# Patient Record
Sex: FEMALE | Race: WHITE | Employment: OTHER | URBAN - METROPOLITAN AREA
[De-identification: names, ages, dates, MRNs, and addresses within clinical notes are randomized per-mention and may not be internally consistent; named-entity substitution may affect disease eponyms.]

---

## 2016-09-13 LAB
ALBUMIN SERPL-MCNC: 4.4 G/DL
ALP BLD-CCNC: 84 U/L
ALT SERPL-CCNC: 11 U/L
ANION GAP SERPL CALCULATED.3IONS-SCNC: 2.2 MMOL/L
AST SERPL-CCNC: 20 U/L
BILIRUB SERPL-MCNC: 0.5 MG/DL (ref 0.1–1.4)
BUN BLDV-MCNC: 28 MG/DL
CALCIUM SERPL-MCNC: 9.6 MG/DL
CHLORIDE BLD-SCNC: 102 MMOL/L
CHOLESTEROL, TOTAL: 193 MG/DL
CHOLESTEROL/HDL RATIO: NORMAL
CO2: 26 MMOL/L
CREAT SERPL-MCNC: 1.4 MG/DL
GFR CALCULATED: 37
GLUCOSE BLD-MCNC: 120 MG/DL
HDLC SERPL-MCNC: 39 MG/DL (ref 35–70)
LDL CHOLESTEROL CALCULATED: 95 MG/DL (ref 0–160)
POTASSIUM SERPL-SCNC: 4.4 MMOL/L
SODIUM BLD-SCNC: 141 MMOL/L
TOTAL PROTEIN: 6.4
TRIGL SERPL-MCNC: 297 MG/DL
VLDLC SERPL CALC-MCNC: NORMAL MG/DL

## 2017-01-16 ENCOUNTER — TELEPHONE (OUTPATIENT)
Dept: CARDIOLOGY CLINIC | Age: 77
End: 2017-01-16

## 2017-01-26 ENCOUNTER — OFFICE VISIT (OUTPATIENT)
Dept: CARDIOLOGY CLINIC | Age: 77
End: 2017-01-26

## 2017-01-26 VITALS
HEIGHT: 60 IN | BODY MASS INDEX: 26.7 KG/M2 | DIASTOLIC BLOOD PRESSURE: 80 MMHG | HEART RATE: 56 BPM | WEIGHT: 136 LBS | SYSTOLIC BLOOD PRESSURE: 150 MMHG

## 2017-01-26 DIAGNOSIS — K21.9 GASTROESOPHAGEAL REFLUX DISEASE WITHOUT ESOPHAGITIS: ICD-10-CM

## 2017-01-26 DIAGNOSIS — N28.9 RENAL INSUFFICIENCY: ICD-10-CM

## 2017-01-26 DIAGNOSIS — I25.119 CORONARY ARTERY DISEASE INVOLVING NATIVE CORONARY ARTERY OF NATIVE HEART WITH ANGINA PECTORIS (HCC): ICD-10-CM

## 2017-01-26 DIAGNOSIS — I10 ESSENTIAL HYPERTENSION: ICD-10-CM

## 2017-01-26 DIAGNOSIS — E78.00 HYPERCHOLESTEROLEMIA: ICD-10-CM

## 2017-01-26 DIAGNOSIS — F41.9 ANXIETY: ICD-10-CM

## 2017-01-26 DIAGNOSIS — R07.2 PRECORDIAL PAIN: Primary | ICD-10-CM

## 2017-01-26 PROCEDURE — 99214 OFFICE O/P EST MOD 30 MIN: CPT | Performed by: INTERNAL MEDICINE

## 2017-01-26 RX ORDER — NITROGLYCERIN 0.4 MG/1
0.4 TABLET SUBLINGUAL EVERY 5 MIN PRN
Qty: 100 TABLET | Refills: 1 | Status: SHIPPED | OUTPATIENT
Start: 2017-01-26

## 2017-02-08 ENCOUNTER — PROCEDURE VISIT (OUTPATIENT)
Dept: CARDIOLOGY CLINIC | Age: 77
End: 2017-02-08

## 2017-02-08 DIAGNOSIS — N28.9 RENAL INSUFFICIENCY: ICD-10-CM

## 2017-02-08 DIAGNOSIS — E78.00 HYPERCHOLESTEROLEMIA: ICD-10-CM

## 2017-02-08 DIAGNOSIS — I25.119 CORONARY ARTERY DISEASE INVOLVING NATIVE CORONARY ARTERY OF NATIVE HEART WITH ANGINA PECTORIS (HCC): ICD-10-CM

## 2017-02-08 DIAGNOSIS — R07.2 PRECORDIAL PAIN: ICD-10-CM

## 2017-02-08 DIAGNOSIS — K21.9 GASTROESOPHAGEAL REFLUX DISEASE WITHOUT ESOPHAGITIS: ICD-10-CM

## 2017-02-08 DIAGNOSIS — F41.9 ANXIETY: ICD-10-CM

## 2017-02-08 DIAGNOSIS — I10 ESSENTIAL HYPERTENSION: ICD-10-CM

## 2017-02-08 PROCEDURE — 93017 CV STRESS TEST TRACING ONLY: CPT | Performed by: INTERNAL MEDICINE

## 2017-02-08 PROCEDURE — 93016 CV STRESS TEST SUPVJ ONLY: CPT | Performed by: INTERNAL MEDICINE

## 2017-02-08 PROCEDURE — 93018 CV STRESS TEST I&R ONLY: CPT | Performed by: INTERNAL MEDICINE

## 2017-02-08 PROCEDURE — 78452 HT MUSCLE IMAGE SPECT MULT: CPT | Performed by: INTERNAL MEDICINE

## 2017-02-08 PROCEDURE — A9500 TC99M SESTAMIBI: HCPCS | Performed by: INTERNAL MEDICINE

## 2017-02-10 ENCOUNTER — TELEPHONE (OUTPATIENT)
Dept: CARDIOLOGY CLINIC | Age: 77
End: 2017-02-10

## 2017-08-28 ENCOUNTER — OFFICE VISIT (OUTPATIENT)
Dept: CARDIOLOGY CLINIC | Age: 77
End: 2017-08-28

## 2017-08-28 VITALS
BODY MASS INDEX: 26.5 KG/M2 | HEART RATE: 60 BPM | DIASTOLIC BLOOD PRESSURE: 70 MMHG | SYSTOLIC BLOOD PRESSURE: 138 MMHG | WEIGHT: 135 LBS | HEIGHT: 60 IN

## 2017-08-28 DIAGNOSIS — E78.00 HYPERCHOLESTEROLEMIA: ICD-10-CM

## 2017-08-28 DIAGNOSIS — I10 ESSENTIAL HYPERTENSION: ICD-10-CM

## 2017-08-28 DIAGNOSIS — I25.119 CORONARY ARTERY DISEASE INVOLVING NATIVE CORONARY ARTERY OF NATIVE HEART WITH ANGINA PECTORIS (HCC): Primary | ICD-10-CM

## 2017-08-28 DIAGNOSIS — N28.9 RENAL INSUFFICIENCY: ICD-10-CM

## 2017-08-28 DIAGNOSIS — K21.9 GASTROESOPHAGEAL REFLUX DISEASE WITHOUT ESOPHAGITIS: ICD-10-CM

## 2017-08-28 PROCEDURE — 99213 OFFICE O/P EST LOW 20 MIN: CPT | Performed by: INTERNAL MEDICINE

## 2018-04-23 ENCOUNTER — HOSPITAL ENCOUNTER (OUTPATIENT)
Dept: SPEECH THERAPY | Age: 78
Discharge: HOME | End: 2018-04-30
Attending: FAMILY MEDICINE | Admitting: FAMILY MEDICINE

## 2018-04-23 DIAGNOSIS — G30.9 ALZHEIMER'S DEMENTIA WITHOUT BEHAVIORAL DISTURBANCE, UNSPECIFIED TIMING OF DEMENTIA ONSET: Primary | ICD-10-CM

## 2018-04-23 DIAGNOSIS — F02.80 ALZHEIMER'S DEMENTIA WITHOUT BEHAVIORAL DISTURBANCE, UNSPECIFIED TIMING OF DEMENTIA ONSET: Primary | ICD-10-CM

## 2018-04-26 ENCOUNTER — HOSPITAL ENCOUNTER (OUTPATIENT)
Dept: SPEECH THERAPY | Age: 78
Discharge: HOME | End: 2018-04-26
Admitting: FAMILY MEDICINE

## 2018-05-01 ENCOUNTER — HOSPITAL ENCOUNTER (OUTPATIENT)
Dept: OTHER | Age: 78
Discharge: HOME | End: 2018-05-22
Attending: FAMILY MEDICINE | Admitting: FAMILY MEDICINE

## 2018-05-03 ENCOUNTER — HOSPITAL ENCOUNTER (OUTPATIENT)
Dept: SPEECH THERAPY | Age: 78
Discharge: HOME | End: 2018-05-03
Admitting: FAMILY MEDICINE

## 2018-05-07 ENCOUNTER — HOSPITAL ENCOUNTER (OUTPATIENT)
Dept: SPEECH THERAPY | Age: 78
Discharge: HOME | End: 2018-05-07
Admitting: FAMILY MEDICINE

## 2018-05-10 ENCOUNTER — HOSPITAL ENCOUNTER (OUTPATIENT)
Dept: SPEECH THERAPY | Age: 78
Discharge: HOME | End: 2018-05-10
Admitting: FAMILY MEDICINE

## 2018-05-14 ENCOUNTER — HOSPITAL ENCOUNTER (OUTPATIENT)
Dept: SPEECH THERAPY | Age: 78
Discharge: HOME | End: 2018-05-14
Admitting: FAMILY MEDICINE

## 2018-07-18 ENCOUNTER — HOSPITAL ENCOUNTER (OUTPATIENT)
Dept: PHYSICAL THERAPY | Age: 78
Discharge: HOME | End: 2018-07-31
Attending: FAMILY MEDICINE | Admitting: FAMILY MEDICINE

## 2018-07-18 NOTE — PROGRESS NOTES
ext only 3-3+/5 juan; hip ext, abd, add 4/5 both legs  Tone RLE  RLE Tone: Normotonic  Tone LLE  LLE Tone: Normotonic  Motor Control  Gross Motor?: WFL     Sensation  Overall Sensation Status: WFL  Bed mobility  Supine to Sit: Independent  Sit to Supine: Independent  Transfers  Sit to Stand: Modified independent  Stand to sit: Modified independent  Ambulation  Ambulation?: Yes  Ambulation 1  Surface: carpet  Device: Rolling Walker     Assessment  Patient presents with chronic LB pain and weakness in back and knee extensors, STM deficits, decreased transfers and decreased gait endurane which impacts on all ADLs standing and walking;patient's goal is to have less back pain ;patient reports that pain, weakness and decreased stand balance  limits activities including those noted; PT to address patient's goals, impairments and activity limitations with skilled interventions checked in plan of care;patient's level of function prior limited due to dementia and LB pain;  barriers to learning during PT eval include cognitive deficts; learning preferences include demonstration, practice, and handouts; patient expressed understanding of HEP; patient appears to be motivated to participate in an active PT program and to be compliant with HEP expectations with assist of spouse;patient assisted in developing treatment plan and goals; 4WW is currently being used;      Current functional level (based on )   : TUG  Conditions Requiring Skilled Therapeutic Intervention  Body structures, Functions, Activity limitations: Decreased functional mobility ; Decreased strength;Decreased endurance  Treatment Diagnosis: Chronic pain in LB; Memory Loss; decreased transfers; decreased gait endurance; decreased dynamic stand balance; decreased functional use of LB; weakness in juan knee extensors.   Prognosis: Fair;Good  Decision Making: Medium Complexity  Patient Education: HEP with daughter and spouse in attendance  Barriers to Learning: Memory

## 2018-08-01 ENCOUNTER — HOSPITAL ENCOUNTER (OUTPATIENT)
Dept: OTHER | Age: 78
Discharge: HOME | End: 2018-08-31
Attending: FAMILY MEDICINE | Admitting: FAMILY MEDICINE

## 2019-06-05 ENCOUNTER — HOSPITAL ENCOUNTER (OUTPATIENT)
Age: 79
Setting detail: SPECIMEN
Discharge: HOME | End: 2019-06-05
Payer: MEDICARE

## 2019-06-05 LAB
BACTERIA: ABNORMAL /HPF
BILIRUBIN URINE: NEGATIVE MG/DL
BLOOD, URINE: ABNORMAL
CLARITY: ABNORMAL
COLOR: YELLOW
GLUCOSE, URINE: NEGATIVE MG/DL
KETONES, URINE: NEGATIVE MG/DL
LEUKOCYTE ESTERASE, URINE: ABNORMAL
MUCUS: ABNORMAL HPF
NITRITE URINE, QUANTITATIVE: NEGATIVE
PH, URINE: 5 (ref 5–8)
PROTEIN UA: 100 MG/DL
RBC URINE: 17 /HPF (ref 0–6)
SPECIFIC GRAVITY UA: 1.02 (ref 1–1.03)
TRICHOMONAS: ABNORMAL /HPF
UROBILINOGEN, URINE: NORMAL MG/DL (ref 0.2–1)
WBC CLUMP: ABNORMAL /HPF
WBC UA: 2696 /HPF (ref 0–5)

## 2019-06-05 PROCEDURE — 87186 SC STD MICRODIL/AGAR DIL: CPT

## 2019-06-05 PROCEDURE — 87077 CULTURE AEROBIC IDENTIFY: CPT

## 2019-06-05 PROCEDURE — 81001 URINALYSIS AUTO W/SCOPE: CPT

## 2019-06-05 PROCEDURE — 87086 URINE CULTURE/COLONY COUNT: CPT

## 2019-06-08 LAB
CULTURE: ABNORMAL
Lab: ABNORMAL
SPECIMEN: ABNORMAL
TOTAL COLONY COUNT: ABNORMAL

## 2020-07-25 ENCOUNTER — APPOINTMENT (OUTPATIENT)
Dept: CT IMAGING | Age: 80
End: 2020-07-25
Payer: MEDICARE

## 2020-07-25 ENCOUNTER — APPOINTMENT (OUTPATIENT)
Dept: GENERAL RADIOLOGY | Age: 80
End: 2020-07-25
Payer: MEDICARE

## 2020-07-25 ENCOUNTER — HOSPITAL ENCOUNTER (EMERGENCY)
Age: 80
Discharge: HOME | End: 2020-07-25
Attending: EMERGENCY MEDICINE
Payer: MEDICARE

## 2020-07-25 VITALS
DIASTOLIC BLOOD PRESSURE: 92 MMHG | RESPIRATION RATE: 12 BRPM | TEMPERATURE: 97.5 F | HEART RATE: 60 BPM | OXYGEN SATURATION: 100 % | SYSTOLIC BLOOD PRESSURE: 155 MMHG

## 2020-07-25 LAB
ALBUMIN SERPL-MCNC: 4.1 GM/DL (ref 3.4–5)
ALP BLD-CCNC: 78 IU/L (ref 40–129)
ALT SERPL-CCNC: 13 U/L (ref 10–40)
AMMONIA: 31 UMOL/L (ref 11–51)
ANION GAP SERPL CALCULATED.3IONS-SCNC: 10 MMOL/L (ref 4–16)
APTT: 27.9 SECONDS (ref 25.1–37.1)
AST SERPL-CCNC: 16 IU/L (ref 15–37)
BACTERIA: ABNORMAL /HPF
BASOPHILS ABSOLUTE: 0.1 K/CU MM
BASOPHILS RELATIVE PERCENT: 0.8 % (ref 0–1)
BILIRUB SERPL-MCNC: 0.5 MG/DL (ref 0–1)
BILIRUBIN URINE: NEGATIVE MG/DL
BLOOD, URINE: NEGATIVE
BUN BLDV-MCNC: 17 MG/DL (ref 6–23)
CALCIUM SERPL-MCNC: 9.3 MG/DL (ref 8.3–10.6)
CHLORIDE BLD-SCNC: 104 MMOL/L (ref 99–110)
CLARITY: ABNORMAL
CO2: 25 MMOL/L (ref 21–32)
COLOR: ABNORMAL
CREAT SERPL-MCNC: 1.1 MG/DL (ref 0.6–1.1)
DIFFERENTIAL TYPE: ABNORMAL
EOSINOPHILS ABSOLUTE: 0.2 K/CU MM
EOSINOPHILS RELATIVE PERCENT: 2.8 % (ref 0–3)
GFR AFRICAN AMERICAN: 58 ML/MIN/1.73M2
GFR NON-AFRICAN AMERICAN: 48 ML/MIN/1.73M2
GLUCOSE BLD-MCNC: 136 MG/DL (ref 70–99)
GLUCOSE, URINE: NEGATIVE MG/DL
HCT VFR BLD CALC: 34.2 % (ref 37–47)
HEMOGLOBIN: 12.2 GM/DL (ref 12.5–16)
IMMATURE NEUTROPHIL %: 0.2 % (ref 0–0.43)
INR BLD: 1.04 INDEX
KETONES, URINE: NEGATIVE MG/DL
LEUKOCYTE ESTERASE, URINE: ABNORMAL
LYMPHOCYTES ABSOLUTE: 1.3 K/CU MM
LYMPHOCYTES RELATIVE PERCENT: 20.6 % (ref 24–44)
MCH RBC QN AUTO: 32.8 PG (ref 27–31)
MCHC RBC AUTO-ENTMCNC: 35.7 % (ref 32–36)
MCV RBC AUTO: 91.9 FL (ref 78–100)
MONOCYTES ABSOLUTE: 0.5 K/CU MM
MONOCYTES RELATIVE PERCENT: 8.2 % (ref 0–4)
MUCUS: ABNORMAL HPF
NITRITE URINE, QUANTITATIVE: POSITIVE
NUCLEATED RBC %: 0 %
PDW BLD-RTO: 12.4 % (ref 11.7–14.9)
PH, URINE: 5 (ref 5–8)
PLATELET # BLD: 159 K/CU MM (ref 140–440)
PMV BLD AUTO: 10.6 FL (ref 7.5–11.1)
POTASSIUM SERPL-SCNC: 3.4 MMOL/L (ref 3.5–5.1)
PRO-BNP: 56.66 PG/ML
PROTEIN UA: NEGATIVE MG/DL
PROTHROMBIN TIME: 12.6 SECONDS (ref 11.7–14.5)
RBC # BLD: 3.72 M/CU MM (ref 4.2–5.4)
RBC URINE: 5 /HPF (ref 0–6)
SEGMENTED NEUTROPHILS ABSOLUTE COUNT: 4.4 K/CU MM
SEGMENTED NEUTROPHILS RELATIVE PERCENT: 67.4 % (ref 36–66)
SODIUM BLD-SCNC: 139 MMOL/L (ref 135–145)
SPECIFIC GRAVITY UA: 1.02 (ref 1–1.03)
SQUAMOUS EPITHELIAL: 3 /HPF
TOTAL IMMATURE NEUTOROPHIL: 0.01 K/CU MM
TOTAL NUCLEATED RBC: 0 K/CU MM
TOTAL PROTEIN: 6.4 GM/DL (ref 6.4–8.2)
TRICHOMONAS: ABNORMAL /HPF
TROPONIN T: <0.01 NG/ML
UROBILINOGEN, URINE: 1 MG/DL (ref 0.2–1)
WBC # BLD: 6.5 K/CU MM (ref 4–10.5)
WBC CLUMP: ABNORMAL /HPF
WBC UA: 116 /HPF (ref 0–5)

## 2020-07-25 PROCEDURE — 72125 CT NECK SPINE W/O DYE: CPT

## 2020-07-25 PROCEDURE — 93005 ELECTROCARDIOGRAM TRACING: CPT | Performed by: EMERGENCY MEDICINE

## 2020-07-25 PROCEDURE — 90471 IMMUNIZATION ADMIN: CPT | Performed by: EMERGENCY MEDICINE

## 2020-07-25 PROCEDURE — 96365 THER/PROPH/DIAG IV INF INIT: CPT

## 2020-07-25 PROCEDURE — 71045 X-RAY EXAM CHEST 1 VIEW: CPT

## 2020-07-25 PROCEDURE — 85730 THROMBOPLASTIN TIME PARTIAL: CPT

## 2020-07-25 PROCEDURE — 83880 ASSAY OF NATRIURETIC PEPTIDE: CPT

## 2020-07-25 PROCEDURE — 85610 PROTHROMBIN TIME: CPT

## 2020-07-25 PROCEDURE — 85025 COMPLETE CBC W/AUTO DIFF WBC: CPT

## 2020-07-25 PROCEDURE — 74176 CT ABD & PELVIS W/O CONTRAST: CPT

## 2020-07-25 PROCEDURE — 99284 EMERGENCY DEPT VISIT MOD MDM: CPT

## 2020-07-25 PROCEDURE — 72170 X-RAY EXAM OF PELVIS: CPT

## 2020-07-25 PROCEDURE — 76376 3D RENDER W/INTRP POSTPROCES: CPT

## 2020-07-25 PROCEDURE — 87186 SC STD MICRODIL/AGAR DIL: CPT

## 2020-07-25 PROCEDURE — 82140 ASSAY OF AMMONIA: CPT

## 2020-07-25 PROCEDURE — 2580000003 HC RX 258: Performed by: EMERGENCY MEDICINE

## 2020-07-25 PROCEDURE — 87077 CULTURE AEROBIC IDENTIFY: CPT

## 2020-07-25 PROCEDURE — 80053 COMPREHEN METABOLIC PANEL: CPT

## 2020-07-25 PROCEDURE — 70450 CT HEAD/BRAIN W/O DYE: CPT

## 2020-07-25 PROCEDURE — 6360000002 HC RX W HCPCS: Performed by: EMERGENCY MEDICINE

## 2020-07-25 PROCEDURE — 84484 ASSAY OF TROPONIN QUANT: CPT

## 2020-07-25 PROCEDURE — 4500000027

## 2020-07-25 PROCEDURE — 71250 CT THORAX DX C-: CPT

## 2020-07-25 PROCEDURE — 81001 URINALYSIS AUTO W/SCOPE: CPT

## 2020-07-25 PROCEDURE — 87086 URINE CULTURE/COLONY COUNT: CPT

## 2020-07-25 PROCEDURE — 90715 TDAP VACCINE 7 YRS/> IM: CPT | Performed by: EMERGENCY MEDICINE

## 2020-07-25 RX ORDER — CIPROFLOXACIN 500 MG/1
500 TABLET, FILM COATED ORAL 2 TIMES DAILY
Qty: 14 TABLET | Refills: 0 | Status: SHIPPED | OUTPATIENT
Start: 2020-07-25 | End: 2020-08-01

## 2020-07-25 RX ORDER — LORAZEPAM 1 MG/1
1 TABLET ORAL EVERY 8 HOURS PRN
COMMUNITY

## 2020-07-25 RX ORDER — QUETIAPINE FUMARATE 300 MG/1
150 TABLET, FILM COATED ORAL NIGHTLY
COMMUNITY

## 2020-07-25 RX ADMIN — CEFTRIAXONE SODIUM 1 G: 1 INJECTION, POWDER, FOR SOLUTION INTRAMUSCULAR; INTRAVENOUS at 17:00

## 2020-07-25 RX ADMIN — TETANUS TOXOID, REDUCED DIPHTHERIA TOXOID AND ACELLULAR PERTUSSIS VACCINE, ADSORBED 0.5 ML: 5; 2.5; 8; 8; 2.5 SUSPENSION INTRAMUSCULAR at 17:20

## 2020-07-25 ASSESSMENT — PAIN DESCRIPTION - LOCATION: LOCATION: HEAD

## 2020-07-25 ASSESSMENT — PAIN SCALES - WONG BAKER: WONGBAKER_NUMERICALRESPONSE: 4

## 2020-07-25 NOTE — ED NOTES
Pt daughter here, states that pt has dementia and is normally confused.      Jorge Simpson RN  07/25/20 [id number]

## 2020-07-25 NOTE — ED NOTES
Bed: 02TR-02  Expected date:   Expected time:   Means of arrival:   Comments:  [facility] 72- trauma alert, 80's f fall, [address] Macedo RN  07/25/20 1939

## 2020-07-25 NOTE — ED PROVIDER NOTES
EMERGENCY DEPARTMENT ENCOUNTER      CHIEF COMPLAINT:   Altered Mental Status    HPI: Joe Ortega is a [de-identified] y.o. female who presents via EMS from home for evaluation of altered mental status. The patient is confused with a history of dementia and so information is obtained from her daughter at the bedside. The daughter states that the patient lives with her. She states that the patient was found lying on the porch by the . There is unclear how long she was there. It is unknown if she fell or had a syncopal episode. She states that she hurts all over. The patient has no other complaints. No further information is available. REVIEW OF SYSTEMS:   \"Remaining review of systems unable to obtain due to patient with altered mental status. I have reviewed the nursing triage documentation and agree unless otherwise noted below. \"      PAST MEDICAL HISTORY:   Past Medical History:   Diagnosis Date    Arthritis     Asthma     CAD (coronary artery disease)     Chest pain 1/2/14    Went through the ER.  Dementia ([address]     GERD (gastroesophageal reflux disease)     H/O cardiovascular stress test 12/31/13    EF 70% 1. Abnormal Lexiscan Cardiolite study revealing left anterior descending territory ischemia, but the possibility of abnormality secondary to breast artifact alone can not be excluded, hence clinical correlation is recommended. 2.Normal left ventricular function by gated scan.  H/O cardiovascular stress test 10/15/14    EF70% Normal Prefusion Study     History of exercise stress test 1/14    History of nuclear stress test 02/08/2017    lexiscan-normal,EF70%    Hx of cardiovascular stress test 10/1/2015    lexiscan-normal,EF70%    Hx of echocardiogram 10/1/2015    EF 55%. Normal chamber sizes. Normal LVSF, but abnormal diastolic function. Mild MR and TR. MIminal pulmonary HTN. Mild, but hemodynamically insignificant aortic stenosis. A small abd aortic aneurysm measuring 2.9cm.      Hypertension     Movement disorder     osteoporosis    Psychiatric problem     S/P PTCA (percutaneous transluminal coronary angioplasty) 1/2/14    1. Three-vessel coronary artery disease,LAD Disease is mild to moderate. 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 The right coronary artery has critical disease as described. 2. Normal resting hemodynamics. 3. Normal left ventricular function and wall motion, no mitral regurgitation. 4. Successful percutaneous intervention with excellent results 3.0 x 26 Resolute stent. CURRENT MEDICATIONS:   Home medications reviewed. SURGICAL HISTORY:   Past Surgical History:   Procedure Laterality Date    CARPAL TUNNEL RELEASE Bilateral     CHOLECYSTECTOMY      COLONOSCOPY  12/14/2015    sigmoid diverticulosis, non bleeding internal hemorrhoids    CORONARY ANGIOPLASTY WITH STENT PLACEMENT  01/02/2014    angioplasty and stenting of RCA, three vessed CAD, LAD has mild to mod, CX has mild disease.     EYE SURGERY      cataract       FAMILY HISTORY:   Family History   Problem Relation Age of Onset    Diabetes Mother     Heart Disease Mother     High Blood Pressure Mother     High Cholesterol Mother     Stroke Mother     Cancer Sister     Diabetes Brother     High Cholesterol Brother     Arthritis Daughter     Asthma Daughter        SOCIAL HISTORY:   Social History     Socioeconomic History    Marital status:      Spouse name: Not on file    Number of children: Not on file    Years of education: Not on file    Highest education level: Not on file   Occupational History    Not on file   Social Needs    Financial resource strain: Not on file    Food insecurity     Worry: Not on file     Inability: Not on file   [employer] needs     Medical: Not on file     Non-medical: Not on file   Tobacco Use    Smoking status: Former Smoker    Smokeless tobacco: Never Used   Substance and Sexual Activity    Alcohol use: No     Alcohol/week: 0.0 standard drinks    Drug use: No    Sexual activity: Not Currently     Partners: Male   Lifestyle    Physical activity     Days per week: Not on file     Minutes per session: Not on file    Stress: Not on file   Relationships    Social connections     Talks on phone: Not on file     Gets together: Not on file     Attends Hindu service: Not on file     Active member of club or organization: Not on file     Attends meetings of clubs or organizations: Not on file     Relationship status: Not on file    Intimate partner violence     Fear of current or ex partner: Not on file     Emotionally abused: Not on file     Physically abused: Not on file     Forced sexual activity: Not on file   Other Topics Concern    Not on file   Social History Narrative    Not on file       ALLERGIES: Bactrim [sulfamethoxazole-trimethoprim] and Pcn [penicillins]    PHYSICAL EXAM:  VITAL SIGNS:   ED Triage Vitals   Enc Vitals Group      BP 07/25/20 1424 (!) 122/92      Pulse 07/25/20 1420 59      Resp 07/25/20 1420 18      Temp 07/25/20 1420 97.5 °F (36.4 °C)      Temp Source 07/25/20 1420 Oral      SpO2 07/25/20 1420 96 %      Weight --       Height --       Head Circumference --       Peak Flow --       Pain Score --       Pain Loc --       Pain Edu? --       Excl. in [address]? --      Constitutional: Awake, alert, confused  HENT: Normocephalic, 5.44 cm laceration noted above the left eyebrow with surrounding abrasion, Bilateral external ears normal, Oropharynx moist, No oral exudates, Nose normal.  Eyes:  PERRL, EOMI, Conjunctiva normal, No discharge. Neck: Normal range of motion, No tenderness, Supple, No stridor, No meningeal signs,No lymphadenopathy   Cardiovascular:  Bradycardic, Regular rhythm  Pulmonary/Chest:  Normal breath sounds, No respiratory distress, No wheezing  Abdomen:   Bowel sounds normal, Soft, No tenderness, No masses, No pulsatile masses  Back:  No tenderness, No CVA tenderness  Extremities:  Normal range of motion, Intact distal pulses, No edema, No tenderness  Neurologic:  Confused, Normal motor function, Sensation intact to light touch throughout, No focal deficits  Skin:  Warm, Dry, No erythema, No rash      EKG Interpretation  Interpreted by me  Compared to 10/16/14  Rhythm: sinus bradycardia  Rate: bradycardic 53  Axis: normal  Ectopy: none  Conduction: normal  ST Segments: no acute change  T Waves: no acute change  Clinical Impression: sinus bradycardia, no acute change    Cardiac Monitor Strip Interpretation  Interpreted by me  Monitor strip interpreted for greater than 10 seconds  Rhythm: sinus bradycardia  Rate: bradycardic  Ectopy: none  ST Segments: normal      Radiology / Procedures:  Laceration Repair Procedure Note  Indication: Laceration    Procedure: The patient was placed in the appropriate position and anesthesia around the laceration was not needed. The area was then cleansed with Shur-Clens and draped in a sterile fashion. The laceration was closed with Dermabond and steri strips. There were no additional lacerations requiring repair. The wound area was then dressed with a bandage. Total repaired wound length: 0.25 cm. Other Items: None    The patient tolerated the procedure well. Complications: None        CT THORACIC RECONSTRUCTION WO POST PROCESS (Final result)   Result time 07/26/20 12:31:49   Procedure changed from [email or web address]   Final result by Andres Mcburney, MD (07/26/20 12:31:49)                 Impression:     Age indeterminate-remote appearing anterior superior endplate compression   fracture T3. Thoracic and lumbar spondylosis. Grade 1 anterolisthesis L4-5 and L5-S1.              Narrative:     EXAMINATION:   CT OF THE LUMBAR SPINE WITHOUT CONTRAST; CT OF THE THORACIC SPINE WITHOUT   CONTRAST  7/25/2020     TECHNIQUE:   CT of the lumbar spine was performed without the administration of   intravenous contrast. Multiplanar reformatted images are provided for review. Dose modulation, iterative reconstruction, and/or weight based adjustment of   the mA/kV was utilized to reduce the radiation dose to as low as reasonably   achievable.; CT of the thoracic spine was performed without the   administration of intravenous contrast. Multiplanar reformatted images are   provided for review. Dose modulation, iterative reconstruction, and/or weight   based adjustment of the mA/kV was utilized to reduce the radiation dose to as   low as reasonably achievable. COMPARISON:   None     HISTORY:   ORDERING SYSTEM PROVIDED HISTORY: BACK PAIN   TECHNOLOGIST PROVIDED HISTORY:   Reason for exam:->Trauma   Reason for Exam: trauma;fall;ams   Acuity: Acute   Type of Exam: Initial   Additional signs and symptoms: found face down unwitness fall; ORDERING   SYSTEM PROVIDED HISTORY: Trauma   TECHNOLOGIST PROVIDED HISTORY:   CT  T-Spine w/o Contrast   Reason for exam:->Trauma   Reason for Exam: fall;ams   Acuity: Acute   Type of Exam: Initial   Additional signs and symptoms: found face down unwitnessed fall     FINDINGS:   BONES/ALIGNMENT: Minimal grade 1 anterolisthesis L4-5 and L5-S1.  Age   indeterminate to remote appearing anterior superior endplate compression   deformity of T3.  No retropulsion.  Remaining vertebral bodies demonstrate   normal height and caliber. DEGENERATIVE CHANGES: There is mild thoracic and more advanced lumbar   spondylosis with multilevel facet arthropathy.  This is most evident to   involve the L4-5 and L5-S1 levels. SOFT TISSUES/RETROPERITONEUM: No paraspinal mass is seen.                       CT LUMBAR RECONSTRUCTION WO POST PROCESS (Final result)   Result time 07/26/20 12:31:49   Procedure changed from [facility]   Final result by Kimberele Ramos MD (07/26/20 12:31:49)                 Impression:     Age indeterminate-remote appearing anterior superior endplate compression   fracture T3.      Thoracic and lumbar spondylosis. Grade 1 anterolisthesis L4-5 and L5-S1. Narrative:     EXAMINATION:   CT OF THE LUMBAR SPINE WITHOUT CONTRAST; CT OF THE THORACIC SPINE WITHOUT   CONTRAST  7/25/2020     TECHNIQUE:   CT of the lumbar spine was performed without the administration of   intravenous contrast. Multiplanar reformatted images are provided for review. Dose modulation, iterative reconstruction, and/or weight based adjustment of   the mA/kV was utilized to reduce the radiation dose to as low as reasonably   achievable.; CT of the thoracic spine was performed without the   administration of intravenous contrast. Multiplanar reformatted images are   provided for review. Dose modulation, iterative reconstruction, and/or weight   based adjustment of the mA/kV was utilized to reduce the radiation dose to as   low as reasonably achievable. COMPARISON:   None     HISTORY:   ORDERING SYSTEM PROVIDED HISTORY: BACK PAIN   TECHNOLOGIST PROVIDED HISTORY:   Reason for exam:->Trauma   Reason for Exam: trauma;fall;ams   Acuity: Acute   Type of Exam: Initial   Additional signs and symptoms: found face down unwitness fall; ORDERING   SYSTEM PROVIDED HISTORY: Trauma   TECHNOLOGIST PROVIDED HISTORY:   CT  T-Spine w/o Contrast   Reason for exam:->Trauma   Reason for Exam: fall;ams   Acuity: Acute   Type of Exam: Initial   Additional signs and symptoms: found face down unwitnessed fall     FINDINGS:   BONES/ALIGNMENT: Minimal grade 1 anterolisthesis L4-5 and L5-S1.  Age   indeterminate to remote appearing anterior superior endplate compression   deformity of T3.  No retropulsion.  Remaining vertebral bodies demonstrate   normal height and caliber. DEGENERATIVE CHANGES: There is mild thoracic and more advanced lumbar   spondylosis with multilevel facet arthropathy.  This is most evident to   involve the L4-5 and L5-S1 levels.      SOFT TISSUES/RETROPERITONEUM: No paraspinal mass is seen.                       CT ABDOMEN PELVIS WO CONTRAST Additional Contrast? None (Final result)   Result time 07/25/20 21:01:26   Final result by Aakash Whitley MD (07/25/20 21:01:26)                 Impression:     1. No acute intrathoracic or abdominal abnormality.  No evidence of solid   organ injury.  Please note that in the absence of intravenous contrast,   sensitivity of the exam is diminished. 2. Incidental finding of 2.2 cm hypodensity in the liver.  Differential   includes cyst and hemangioma.  If clinical concern persists, liver would   better be assessed with a dedicated liver MRI on a nonemergent basis.  This   is unrelated to patient's trauma. 3. Atherosclerosis and coronary artery disease. 4. Large hiatal hernia. 5. Mild centrilobular emphysema. Narrative:     EXAMINATION:   CT OF THE ABDOMEN AND PELVIS WITHOUT CONTRAST; CT OF THE CHEST WITHOUT   CONTRAST 7/25/2020 3:10 pm     TECHNIQUE:   CT of the abdomen and pelvis was performed without the administration of   intravenous contrast. Multiplanar reformatted images are provided for review. Dose modulation, iterative reconstruction, and/or weight based adjustment of   the mA/kV was utilized to reduce the radiation dose to as low as reasonably   achievable.; CT of the chest was performed without the administration of   intravenous contrast. Multiplanar reformatted images are provided for review. Dose modulation, iterative reconstruction, and/or weight based adjustment of   the mA/kV was utilized to reduce the radiation dose to as low as reasonably   achievable.      COMPARISON:   None     HISTORY:   ORDERING SYSTEM PROVIDED HISTORY: Trauma, pelvic pain   TECHNOLOGIST PROVIDED HISTORY:   Reason for exam:->Trauma, pelvic pain   Additional Contrast?->None   Reason for Exam: fall;ams   Acuity: Acute   Type of Exam: Initial   Additional signs and symptoms: injury; ORDERING SYSTEM PROVIDED HISTORY:   Fall, trauma   TECHNOLOGIST PROVIDED HISTORY:   Reason for exam:->Fall, trauma   Reason for Exam: injury; fall   Acuity: Acute   Type of Exam: Initial   Additional signs and symptoms: found face down by mail box     Acute chest and abdominal pain.  Patient fell.  Initial encounter. FINDINGS:     Chest:     Mediastinum: Heart size is within normal range.  No pericardial effusion or   mediastinal hematoma.  Visible portion of the thyroid is unremarkable. Coronary artery atherosclerosis.  There are coarsely calcified subcarinal   nodes.  Within the limitations of a noncontrast exam, there is no evidence of   hilar adenopathy.  Large hiatal hernia. Lungs/pleura: Central tracheobronchial airways are unremarkable.  No focal   consolidation, pleural effusion, or pneumothorax.  Mild centrilobular   emphysema. Soft Tissues/Bones: Sternum is intact.  No aggressive lytic or blastic bony   lesion.  Please refer to separate report for CT of the thoracic spine   obtained concurrently. Abdomen/Pelvis:     Organs: Cholecystectomy.  Hypodensities in the liver, measuring up to 2.2 cm,   are not adequately assessed on this noncontrast exam. Artie Longest are unrelated to   patient's trauma.  Spleen, pancreas, adrenal glands, and kidneys are   unremarkable.  No hydronephrosis. GI/Bowel: Moderate volume of stool in the colon.  Normal appendix.  No   evidence of bowel obstruction or free intraperitoneal air.  No appreciable   bowel wall thickening or mesenteric hematoma. Pelvis: Bladder is under distended.  No free fluid in the pelvis. Peritoneum/Retroperitoneum: Abdominal aorta is atherosclerotic.  No   retroperitoneal hematoma.      Bones/Soft Tissues: Symphysis pubis interval is normal.  Osseous pelvis is   intact.  Femoral heads align normally with the acetabula.  Please refer to   separate report for CT of the lumbar spine obtained concurrently.                       CT CHEST WO CONTRAST (Final result)   Result time 07/25/20 21:01:26   Final result by Humza Castanon MD (07/25/20 21:01:26)                 Impression:     1. No acute intrathoracic or abdominal abnormality.  No evidence of solid   organ injury.  Please note that in the absence of intravenous contrast,   sensitivity of the exam is diminished. 2. Incidental finding of 2.2 cm hypodensity in the liver.  Differential   includes cyst and hemangioma.  If clinical concern persists, liver would   better be assessed with a dedicated liver MRI on a nonemergent basis.  This   is unrelated to patient's trauma. 3. Atherosclerosis and coronary artery disease. 4. Large hiatal hernia. 5. Mild centrilobular emphysema. Narrative:     EXAMINATION:   CT OF THE ABDOMEN AND PELVIS WITHOUT CONTRAST; CT OF THE CHEST WITHOUT   CONTRAST 7/25/2020 3:10 pm     TECHNIQUE:   CT of the abdomen and pelvis was performed without the administration of   intravenous contrast. Multiplanar reformatted images are provided for review. Dose modulation, iterative reconstruction, and/or weight based adjustment of   the mA/kV was utilized to reduce the radiation dose to as low as reasonably   achievable.; CT of the chest was performed without the administration of   intravenous contrast. Multiplanar reformatted images are provided for review. Dose modulation, iterative reconstruction, and/or weight based adjustment of   the mA/kV was utilized to reduce the radiation dose to as low as reasonably   achievable.      COMPARISON:   None     HISTORY:   ORDERING SYSTEM PROVIDED HISTORY: Trauma, pelvic pain   TECHNOLOGIST PROVIDED HISTORY:   Reason for exam:->Trauma, pelvic pain   Additional Contrast?->None   Reason for Exam: fall;ams   Acuity: Acute   Type of Exam: Initial   Additional signs and symptoms: injury; ORDERING SYSTEM PROVIDED HISTORY:   Fall, trauma   TECHNOLOGIST PROVIDED HISTORY:   Reason for exam:->Fall, trauma   Reason for Exam: injury; fall   Acuity: Acute   Type of Exam: Initial   Additional signs and symptoms: found face down by mail box     Acute chest and abdominal pain.  Patient fell.  Initial encounter. FINDINGS:     Chest:     Mediastinum: Heart size is within normal range.  No pericardial effusion or   mediastinal hematoma.  Visible portion of the thyroid is unremarkable. Coronary artery atherosclerosis.  There are coarsely calcified subcarinal   nodes.  Within the limitations of a noncontrast exam, there is no evidence of   hilar adenopathy.  Large hiatal hernia. Lungs/pleura: Central tracheobronchial airways are unremarkable.  No focal   consolidation, pleural effusion, or pneumothorax.  Mild centrilobular   emphysema. Soft Tissues/Bones: Sternum is intact.  No aggressive lytic or blastic bony   lesion.  Please refer to separate report for CT of the thoracic spine   obtained concurrently. Abdomen/Pelvis:     Organs: Cholecystectomy.  Hypodensities in the liver, measuring up to 2.2 cm,   are not adequately assessed on this noncontrast exam. Rozanna Crimes are unrelated to   patient's trauma.  Spleen, pancreas, adrenal glands, and kidneys are   unremarkable.  No hydronephrosis. GI/Bowel: Moderate volume of stool in the colon.  Normal appendix.  No   evidence of bowel obstruction or free intraperitoneal air.  No appreciable   bowel wall thickening or mesenteric hematoma. Pelvis: Bladder is under distended.  No free fluid in the pelvis. Peritoneum/Retroperitoneum: Abdominal aorta is atherosclerotic.  No   retroperitoneal hematoma. Bones/Soft Tissues: Symphysis pubis interval is normal.  Osseous pelvis is   intact.  Femoral heads align normally with the acetabula.  Please refer to   separate report for CT of the lumbar spine obtained concurrently.                       CT CERVICAL SPINE WO CONTRAST (Final result)   Result time 07/25/20 15:57:56   Final result by Chapito Joseph MD (07/25/20 15:57:56)                 Impression:     No acute osseous abnormality of the cervical spine.              Narrative: EXAMINATION:   CT OF THE CERVICAL SPINE WITHOUT CONTRAST 7/25/2020 3:10 pm     TECHNIQUE:   CT of the cervical spine was performed without the administration of   intravenous contrast. Multiplanar reformatted images are provided for review. Dose modulation, iterative reconstruction, and/or weight based adjustment of   the mA/kV was utilized to reduce the radiation dose to as low as reasonably   achievable. COMPARISON:   None. HISTORY:   ORDERING SYSTEM PROVIDED HISTORY: Fall, neck pain   TECHNOLOGIST PROVIDED HISTORY:   Reason for exam:->Fall, neck pain   Reason for Exam: fall;ams   Acuity: Acute   Type of Exam: Initial   Additional signs and symptoms: found face down by mail box     FINDINGS:   BONES/ALIGNMENT: There is no acute fracture or traumatic malalignment. DEGENERATIVE CHANGES: Mild to moderate multilevel degenerative disease most   evident C6-C7. SOFT TISSUES: There is no prevertebral soft tissue swelling.                       CT HEAD WO CONTRAST (Final result)   Result time 07/25/20 16:25:15   Final result by Les Saavedra MD (07/25/20 16:25:15)                 Impression:     No acute intracranial abnormality. Chronic microvascular ischemic changes. Symmetric dilation of the lateral ventricles which is out of proportion to   the level of atrophy can be seen with normal pressure hydrocephalus. Correlate clinically. Narrative:     EXAMINATION:   CT OF THE HEAD WITHOUT CONTRAST  7/25/2020 3:09 pm     TECHNIQUE:   CT of the head was performed without the administration of intravenous   contrast. Dose modulation, iterative reconstruction, and/or weight based   adjustment of the mA/kV was utilized to reduce the radiation dose to as low   as reasonably achievable. COMPARISON:   None.      HISTORY:   ORDERING SYSTEM PROVIDED HISTORY: Fall, altered mental status   TECHNOLOGIST PROVIDED HISTORY:   Reason for exam:->Fall, altered mental status   Has a \"code stroke\" or \"stroke alert\" been called? ->No   Reason for Exam: fall;ams   Acuity: Acute   Type of Exam: Initial   Additional signs and symptoms: found face down outside by mailbox     FINDINGS:   BRAIN/VENTRICLES: There is no acute intracranial hemorrhage, mass effect or   midline shift.  No abnormal extra-axial fluid collection.  Lucencies within   the periventricular and subcortical white matter likely represent chronic   microvascular ischemic changes.   The gray-white differentiation is   maintained without evidence of an acute infarct.  Symmetric dilation of the   lateral ventricles which is out of proportion to the level of atrophy can be   seen with normal pressure hydrocephalus.  Correlate clinically. ORBITS: The visualized portion of the orbits demonstrate no acute abnormality. SINUSES: The visualized paranasal sinuses and mastoid air cells demonstrate   no acute abnormality. SOFT TISSUES/SKULL:  Small left frontal scalp hematoma.                       XR PELVIS (1-2 VIEWS) (Final result)   Result time 07/25/20 15:13:01   Final result by Angelic Kilpatrick MD (07/25/20 15:13:01)                 Impression:     1. No acute osseous abnormality in the pelvis. Narrative:     EXAMINATION:   ONE XRAY VIEW OF THE PELVIS     7/25/2020 2:32 pm     COMPARISON:   None     HISTORY:   ORDERING SYSTEM PROVIDED HISTORY: trauma   TECHNOLOGIST PROVIDED HISTORY:   Reason for exam:->trauma     Initial workup for trauma.  Pelvic pain.  Initial encounter. FINDINGS:   Mild bilateral hip degenerative changes.  Symphysis pubis interval is normal.   Sacroiliac joints are unremarkable.  Sacral arcuate lines are uninterrupted. Femoral heads align normally with the acetabula.  Osseous pelvis is intact.                       XR CHEST PORTABLE (Final result)   Result time 07/25/20 15:13:47   Final result by Elma Meckel, MD (07/25/20 15:13:47)                 Impression:     No acute cardiopulmonary disease. Narrative:     EXAMINATION:   ONE XRAY VIEW OF THE CHEST     7/25/2020 2:32 pm     COMPARISON:   10/16/2014. HISTORY:   ORDERING SYSTEM PROVIDED HISTORY: chest pain   TECHNOLOGIST PROVIDED HISTORY:   Reason for exam:->chest pain     FINDINGS:   The cardiomediastinal silhouette is unremarkable.  The lungs are clear.  No   infiltrate, pleural fluid or evidence of overt failure.  Aortic vascular   calcification.  Post cholecystectomy clips. Labs Reviewed   CULTURE, URINE - Abnormal; Notable for the following components:       Result Value    Culture ESCHERICHIA COLI >100,000 CFU/ml (*)     All other components within normal limits    Narrative:     SETUP DATE/TIME:  07/25/2020 1849   CBC WITH AUTO DIFFERENTIAL - Abnormal; Notable for the following components:    RBC 3.72 (*)     Hemoglobin 12.2 (*)     Hematocrit 34.2 (*)     MCH 32.8 (*)     Segs Relative 67.4 (*)     Lymphocytes % 20.6 (*)     Monocytes % 8.2 (*)     All other components within normal limits   COMPREHENSIVE METABOLIC PANEL - Abnormal; Notable for the following components:    Potassium 3.4 (*)     Glucose 136 (*)     GFR Non- 48 (*)     GFR  58 (*)     All other components within normal limits   URINALYSIS WITH MICROSCOPIC - Abnormal; Notable for the following components:    Color, UA GERARDO (*)     Clarity, UA HAZY (*)     Nitrite Urine, Quantitative POSITIVE (*)     Leukocyte Esterase, Urine MODERATE (*)     WBC,  (*)     Bacteria, UA RARE (*)     Mucus, UA FEW (*)     All other components within normal limits   AMMONIA   TROPONIN   BRAIN NATRIURETIC PEPTIDE   PROTIME/INR & PTT     ED COURSE & MEDICAL DECISION MAKING:  Pertinent Labs & Imaging studies reviewed. (See chart for details)  On exam, the patient is afebrile and nontoxic appearing. She is hemodynamically stable and at her neurological baseline. EKG shows sinus bradycardia with no ST elevation or depression.  Labs are obtained and are significant for a UTI with no other clinically significant lab abnormalities. Chest x-ray is negative. X-ray of the pelvis is negative. CT head is negative for acute intracranial abnormality. There is symmetric dilatation of the lateral ventricles which is out of proportion to the level of atrophy which can be seen with normal pressure hydrocephalus. CT cervical spine is negative. CT of the chest/abd/pelvis is negative for acute intrathoracic or abdominal abnormality. There is no evidence of solid organ injury. CT of the thoracic and lumbar spine show an age-indeterminate remote appearing anterior superior endplate compression fracture of T3. There is thoracic and lumbar spondylosis. There is grade 1 anterolisthesis of L4 -L5 and L5-S1. The patient was treated with IV Rocephin. Her  stated. Her forehead laceration was repaired with Dermabond. I suspect that the patient had a mechanical fall versus a syncopal episode. . I have a low suspicion for CVA, intracranial hemorrhage, intoxication, psychosis, meningitis, brain mass, or sepsis. I discussed admission to the hospital versus outpatient management with the patient's daughter and she preferred to take her home. I feel that the patient is stable for outpatient management with follow up in 2-3 days. The patient and daughter were given return precautions. The daughter verbalized understanding, was agreeable with plan, and the patient was discharged home in stable condition. Clinical Impression:  1. Fall, initial encounter    2. Acute cystitis without hematuria    3.  Laceration of forehead, initial encounter        Disposition referral (if applicable):  Selma Lindquist MD  [address]  [address] [facility].  [phone]    Schedule an appointment as soon as possible for a visit in 2 days      [facility] Emergency Department  [facility] [place] [phone]  Go to   If symptoms worsen      Disposition medications (if applicable):  Discharge Medication List as of 7/25/2020  5:50 PM      START taking these medications    Details   ciprofloxacin (CIPRO) 500 MG tablet Take 1 tablet by mouth 2 times daily for 7 days, Disp-14 tablet,R-0Print               Comment: Please note this report has been produced using speech recognition software and may contain errors related to that system including errors in grammar, punctuation, and spelling, as well as words and phrases that may be inappropriate. If there are any questions or concerns please feel free to contact the dictating provider for clarification.         Jennifer Soto MD  08/07/20 8424

## 2020-07-25 NOTE — ED NOTES
reviewed discharge instructions, follow up instructions, and new medications with patients daughter. . Patients daughter given printed prescriptions. Patients daughter verbalizes understanding with no further questions. IV access removed.      So Hay  07/25/20 1842 Nathan Tirado (spouse)

## 2020-07-25 NOTE — ED TRIAGE NOTES
Pt to the ED via EMS from home after fall. Unknown hog long pt was down, found on porch by .   Pt is alert to person only

## 2020-07-27 LAB
CULTURE: ABNORMAL
CULTURE: ABNORMAL
Lab: ABNORMAL
SPECIMEN: ABNORMAL

## 2020-07-27 PROCEDURE — 93010 ELECTROCARDIOGRAM REPORT: CPT | Performed by: INTERNAL MEDICINE

## 2020-07-29 LAB
EKG ATRIAL RATE: 53 BPM
EKG DIAGNOSIS: NORMAL
EKG P AXIS: 40 DEGREES
EKG P-R INTERVAL: 180 MS
EKG Q-T INTERVAL: 452 MS
EKG QRS DURATION: 100 MS
EKG QTC CALCULATION (BAZETT): 424 MS
EKG R AXIS: -42 DEGREES
EKG T AXIS: 33 DEGREES
EKG VENTRICULAR RATE: 53 BPM

## 2020-12-15 ENCOUNTER — APPOINTMENT (OUTPATIENT)
Dept: CT IMAGING | Age: 80
End: 2020-12-15
Payer: MEDICARE

## 2020-12-15 ENCOUNTER — HOSPITAL ENCOUNTER (OUTPATIENT)
Age: 80
Setting detail: OBSERVATION
Discharge: HOME HEALTH | End: 2020-12-21
Attending: INTERNAL MEDICINE | Admitting: INTERNAL MEDICINE
Payer: MEDICARE

## 2020-12-15 DIAGNOSIS — R11.2 NON-INTRACTABLE VOMITING WITH NAUSEA, UNSPECIFIED VOMITING TYPE: Primary | ICD-10-CM

## 2020-12-15 DIAGNOSIS — R11.13 FECULENT VOMIT ON EXAMINATION: ICD-10-CM

## 2020-12-15 LAB
ALBUMIN SERPL-MCNC: 3.9 GM/DL (ref 3.4–5)
ALP BLD-CCNC: 104 IU/L (ref 40–129)
ALT SERPL-CCNC: 13 U/L (ref 10–40)
ANION GAP SERPL CALCULATED.3IONS-SCNC: 13 MMOL/L (ref 4–16)
AST SERPL-CCNC: 17 IU/L (ref 15–37)
BASOPHILS ABSOLUTE: 0 K/CU MM
BASOPHILS RELATIVE PERCENT: 0.1 % (ref 0–1)
BILIRUB SERPL-MCNC: 0.9 MG/DL (ref 0–1)
BUN BLDV-MCNC: 25 MG/DL (ref 6–23)
CALCIUM SERPL-MCNC: 10 MG/DL (ref 8.3–10.6)
CHLORIDE BLD-SCNC: 98 MMOL/L (ref 99–110)
CO2: 28 MMOL/L (ref 21–32)
CREAT SERPL-MCNC: 0.8 MG/DL (ref 0.6–1.1)
DIFFERENTIAL TYPE: ABNORMAL
EOSINOPHILS ABSOLUTE: 0 K/CU MM
EOSINOPHILS RELATIVE PERCENT: 0 % (ref 0–3)
GFR AFRICAN AMERICAN: >60 ML/MIN/1.73M2
GFR NON-AFRICAN AMERICAN: >60 ML/MIN/1.73M2
GLUCOSE BLD-MCNC: 180 MG/DL (ref 70–99)
GONADOTROPIN, CHORIONIC (HCG) QUANT: 2.2 UIU/ML
HCT VFR BLD CALC: 40 % (ref 37–47)
HEMOGLOBIN: 14.4 GM/DL (ref 12.5–16)
IMMATURE NEUTROPHIL %: 0.6 % (ref 0–0.43)
LIPASE: 15 IU/L (ref 13–60)
LYMPHOCYTES ABSOLUTE: 0.7 K/CU MM
LYMPHOCYTES RELATIVE PERCENT: 5 % (ref 24–44)
MCH RBC QN AUTO: 33.1 PG (ref 27–31)
MCHC RBC AUTO-ENTMCNC: 36 % (ref 32–36)
MCV RBC AUTO: 92 FL (ref 78–100)
MONOCYTES ABSOLUTE: 0.7 K/CU MM
MONOCYTES RELATIVE PERCENT: 4.8 % (ref 0–4)
NUCLEATED RBC %: 0 %
PDW BLD-RTO: 12.2 % (ref 11.7–14.9)
PLATELET # BLD: 159 K/CU MM (ref 140–440)
PMV BLD AUTO: 10.3 FL (ref 7.5–11.1)
POTASSIUM SERPL-SCNC: 3.8 MMOL/L (ref 3.5–5.1)
RBC # BLD: 4.35 M/CU MM (ref 4.2–5.4)
SEGMENTED NEUTROPHILS ABSOLUTE COUNT: 12.8 K/CU MM
SEGMENTED NEUTROPHILS RELATIVE PERCENT: 89.5 % (ref 36–66)
SODIUM BLD-SCNC: 139 MMOL/L (ref 135–145)
TOTAL IMMATURE NEUTOROPHIL: 0.08 K/CU MM
TOTAL NUCLEATED RBC: 0 K/CU MM
TOTAL PROTEIN: 6.6 GM/DL (ref 6.4–8.2)
WBC # BLD: 14.3 K/CU MM (ref 4–10.5)

## 2020-12-15 PROCEDURE — 2580000003 HC RX 258: Performed by: PHYSICIAN ASSISTANT

## 2020-12-15 PROCEDURE — 87086 URINE CULTURE/COLONY COUNT: CPT

## 2020-12-15 PROCEDURE — 6360000004 HC RX CONTRAST MEDICATION: Performed by: PHYSICIAN ASSISTANT

## 2020-12-15 PROCEDURE — 85025 COMPLETE CBC W/AUTO DIFF WBC: CPT

## 2020-12-15 PROCEDURE — 99283 EMERGENCY DEPT VISIT LOW MDM: CPT

## 2020-12-15 PROCEDURE — 84702 CHORIONIC GONADOTROPIN TEST: CPT

## 2020-12-15 PROCEDURE — 80053 COMPREHEN METABOLIC PANEL: CPT

## 2020-12-15 PROCEDURE — 74177 CT ABD & PELVIS W/CONTRAST: CPT

## 2020-12-15 PROCEDURE — 83690 ASSAY OF LIPASE: CPT

## 2020-12-15 RX ORDER — SODIUM CHLORIDE 9 MG/ML
INJECTION, SOLUTION INTRAVENOUS CONTINUOUS
Status: DISCONTINUED | OUTPATIENT
Start: 2020-12-15 | End: 2020-12-16

## 2020-12-15 RX ORDER — SODIUM CHLORIDE 0.9 % (FLUSH) 0.9 %
10 SYRINGE (ML) INJECTION 2 TIMES DAILY
Status: DISCONTINUED | OUTPATIENT
Start: 2020-12-15 | End: 2020-12-21 | Stop reason: HOSPADM

## 2020-12-15 RX ORDER — ONDANSETRON 2 MG/ML
4 INJECTION INTRAMUSCULAR; INTRAVENOUS EVERY 30 MIN PRN
Status: DISCONTINUED | OUTPATIENT
Start: 2020-12-15 | End: 2020-12-16 | Stop reason: ALTCHOICE

## 2020-12-15 RX ADMIN — IOPAMIDOL 80 ML: 755 INJECTION, SOLUTION INTRAVENOUS at 23:47

## 2020-12-15 RX ADMIN — SODIUM CHLORIDE, PRESERVATIVE FREE 10 ML: 5 INJECTION INTRAVENOUS at 23:47

## 2020-12-15 RX ADMIN — SODIUM CHLORIDE: 9 INJECTION, SOLUTION INTRAVENOUS at 22:16

## 2020-12-16 PROBLEM — R11.2 INTRACTABLE VOMITING WITH NAUSEA: Status: ACTIVE | Noted: 2020-12-16

## 2020-12-16 LAB
BACTERIA: ABNORMAL /HPF
BILIRUBIN URINE: NEGATIVE MG/DL
BLOOD, URINE: NEGATIVE
CLARITY: CLEAR
COLOR: YELLOW
GLUCOSE, URINE: NEGATIVE MG/DL
KETONES, URINE: NEGATIVE MG/DL
LEUKOCYTE ESTERASE, URINE: ABNORMAL
NITRITE URINE, QUANTITATIVE: NEGATIVE
PH, URINE: 5 (ref 5–8)
PROTEIN UA: 30 MG/DL
RBC URINE: 2 /HPF (ref 0–6)
SPECIFIC GRAVITY UA: >1.06 (ref 1–1.03)
SQUAMOUS EPITHELIAL: 9 /HPF
TRICHOMONAS: ABNORMAL /HPF
UROBILINOGEN, URINE: NORMAL MG/DL (ref 0.2–1)
WBC UA: 4 /HPF (ref 0–5)

## 2020-12-16 PROCEDURE — 6360000002 HC RX W HCPCS: Performed by: HOSPITALIST

## 2020-12-16 PROCEDURE — 96375 TX/PRO/DX INJ NEW DRUG ADDON: CPT

## 2020-12-16 PROCEDURE — 6360000002 HC RX W HCPCS: Performed by: INTERNAL MEDICINE

## 2020-12-16 PROCEDURE — 96372 THER/PROPH/DIAG INJ SC/IM: CPT

## 2020-12-16 PROCEDURE — 6370000000 HC RX 637 (ALT 250 FOR IP): Performed by: INTERNAL MEDICINE

## 2020-12-16 PROCEDURE — 2580000003 HC RX 258: Performed by: INTERNAL MEDICINE

## 2020-12-16 PROCEDURE — 2580000003 HC RX 258: Performed by: HOSPITALIST

## 2020-12-16 PROCEDURE — 6360000002 HC RX W HCPCS: Performed by: NURSE PRACTITIONER

## 2020-12-16 PROCEDURE — 6370000000 HC RX 637 (ALT 250 FOR IP): Performed by: NURSE PRACTITIONER

## 2020-12-16 PROCEDURE — 81001 URINALYSIS AUTO W/SCOPE: CPT

## 2020-12-16 PROCEDURE — 94761 N-INVAS EAR/PLS OXIMETRY MLT: CPT

## 2020-12-16 PROCEDURE — 96365 THER/PROPH/DIAG IV INF INIT: CPT

## 2020-12-16 PROCEDURE — G0378 HOSPITAL OBSERVATION PER HR: HCPCS

## 2020-12-16 PROCEDURE — 96376 TX/PRO/DX INJ SAME DRUG ADON: CPT

## 2020-12-16 PROCEDURE — 2700000000 HC OXYGEN THERAPY PER DAY

## 2020-12-16 RX ORDER — POLYETHYLENE GLYCOL 3350 17 G/17G
17 POWDER, FOR SOLUTION ORAL DAILY PRN
Status: DISCONTINUED | OUTPATIENT
Start: 2020-12-16 | End: 2020-12-21 | Stop reason: HOSPADM

## 2020-12-16 RX ORDER — LUBIPROSTONE 24 UG/1
24 CAPSULE, GELATIN COATED ORAL 2 TIMES DAILY WITH MEALS
Status: DISCONTINUED | OUTPATIENT
Start: 2020-12-16 | End: 2020-12-21 | Stop reason: HOSPADM

## 2020-12-16 RX ORDER — MEMANTINE HYDROCHLORIDE 10 MG/1
10 TABLET ORAL 2 TIMES DAILY
Status: DISCONTINUED | OUTPATIENT
Start: 2020-12-16 | End: 2020-12-21 | Stop reason: HOSPADM

## 2020-12-16 RX ORDER — CARVEDILOL 6.25 MG/1
6.25 TABLET ORAL 2 TIMES DAILY WITH MEALS
Status: DISCONTINUED | OUTPATIENT
Start: 2020-12-16 | End: 2020-12-21 | Stop reason: HOSPADM

## 2020-12-16 RX ORDER — SODIUM CHLORIDE 0.9 % (FLUSH) 0.9 %
10 SYRINGE (ML) INJECTION PRN
Status: DISCONTINUED | OUTPATIENT
Start: 2020-12-16 | End: 2020-12-21 | Stop reason: HOSPADM

## 2020-12-16 RX ORDER — ATORVASTATIN CALCIUM 20 MG/1
20 TABLET, FILM COATED ORAL DAILY
Status: DISCONTINUED | OUTPATIENT
Start: 2020-12-16 | End: 2020-12-21 | Stop reason: HOSPADM

## 2020-12-16 RX ORDER — DONEPEZIL HYDROCHLORIDE 10 MG/1
10 TABLET, FILM COATED ORAL NIGHTLY
Status: DISCONTINUED | OUTPATIENT
Start: 2020-12-16 | End: 2020-12-21 | Stop reason: HOSPADM

## 2020-12-16 RX ORDER — ONDANSETRON 2 MG/ML
4 INJECTION INTRAMUSCULAR; INTRAVENOUS 3 TIMES DAILY
Status: COMPLETED | OUTPATIENT
Start: 2020-12-16 | End: 2020-12-18

## 2020-12-16 RX ORDER — SODIUM PHOSPHATE, DIBASIC AND SODIUM PHOSPHATE, MONOBASIC 7; 19 G/133ML; G/133ML
1 ENEMA RECTAL 2 TIMES DAILY
Status: DISCONTINUED | OUTPATIENT
Start: 2020-12-16 | End: 2020-12-18

## 2020-12-16 RX ORDER — SENNA PLUS 8.6 MG/1
1 TABLET ORAL 2 TIMES DAILY
Status: DISCONTINUED | OUTPATIENT
Start: 2020-12-16 | End: 2020-12-16

## 2020-12-16 RX ORDER — PANTOPRAZOLE SODIUM 40 MG/1
40 TABLET, DELAYED RELEASE ORAL
Status: DISCONTINUED | OUTPATIENT
Start: 2020-12-16 | End: 2020-12-19

## 2020-12-16 RX ORDER — PROMETHAZINE HYDROCHLORIDE 25 MG/1
12.5 TABLET ORAL EVERY 6 HOURS PRN
Status: DISCONTINUED | OUTPATIENT
Start: 2020-12-16 | End: 2020-12-21 | Stop reason: HOSPADM

## 2020-12-16 RX ORDER — SODIUM CHLORIDE 0.9 % (FLUSH) 0.9 %
10 SYRINGE (ML) INJECTION EVERY 12 HOURS SCHEDULED
Status: DISCONTINUED | OUTPATIENT
Start: 2020-12-16 | End: 2020-12-21 | Stop reason: HOSPADM

## 2020-12-16 RX ORDER — ACETAMINOPHEN 325 MG/1
650 TABLET ORAL EVERY 6 HOURS PRN
Status: DISCONTINUED | OUTPATIENT
Start: 2020-12-16 | End: 2020-12-21 | Stop reason: HOSPADM

## 2020-12-16 RX ORDER — ENALAPRIL MALEATE 5 MG/1
5 TABLET ORAL DAILY
Status: DISCONTINUED | OUTPATIENT
Start: 2020-12-16 | End: 2020-12-21 | Stop reason: HOSPADM

## 2020-12-16 RX ORDER — METOCLOPRAMIDE HYDROCHLORIDE 5 MG/ML
10 INJECTION INTRAMUSCULAR; INTRAVENOUS EVERY 8 HOURS
Status: DISPENSED | OUTPATIENT
Start: 2020-12-16 | End: 2020-12-20

## 2020-12-16 RX ORDER — ACETAMINOPHEN 650 MG/1
650 SUPPOSITORY RECTAL EVERY 6 HOURS PRN
Status: DISCONTINUED | OUTPATIENT
Start: 2020-12-16 | End: 2020-12-21 | Stop reason: HOSPADM

## 2020-12-16 RX ORDER — ASPIRIN 81 MG/1
81 TABLET ORAL DAILY
Status: DISCONTINUED | OUTPATIENT
Start: 2020-12-16 | End: 2020-12-21 | Stop reason: HOSPADM

## 2020-12-16 RX ORDER — DILTIAZEM HYDROCHLORIDE 120 MG/1
120 CAPSULE, COATED, EXTENDED RELEASE ORAL DAILY
Status: DISCONTINUED | OUTPATIENT
Start: 2020-12-16 | End: 2020-12-21 | Stop reason: HOSPADM

## 2020-12-16 RX ORDER — SENNA PLUS 8.6 MG/1
2 TABLET ORAL NIGHTLY
Status: DISCONTINUED | OUTPATIENT
Start: 2020-12-17 | End: 2020-12-21 | Stop reason: HOSPADM

## 2020-12-16 RX ORDER — SODIUM CHLORIDE 9 MG/ML
INJECTION, SOLUTION INTRAVENOUS CONTINUOUS
Status: DISCONTINUED | OUTPATIENT
Start: 2020-12-16 | End: 2020-12-21 | Stop reason: HOSPADM

## 2020-12-16 RX ORDER — ONDANSETRON 2 MG/ML
4 INJECTION INTRAMUSCULAR; INTRAVENOUS EVERY 6 HOURS PRN
Status: DISCONTINUED | OUTPATIENT
Start: 2020-12-16 | End: 2020-12-21 | Stop reason: HOSPADM

## 2020-12-16 RX ORDER — MEMANTINE HYDROCHLORIDE 28 MG/1
28 CAPSULE, EXTENDED RELEASE ORAL DAILY
Status: DISCONTINUED | OUTPATIENT
Start: 2020-12-16 | End: 2020-12-16 | Stop reason: CLARIF

## 2020-12-16 RX ADMIN — ONDANSETRON 4 MG: 2 INJECTION INTRAMUSCULAR; INTRAVENOUS at 21:52

## 2020-12-16 RX ADMIN — DONEPEZIL HYDROCHLORIDE 10 MG: 10 TABLET, FILM COATED ORAL at 22:01

## 2020-12-16 RX ADMIN — SODIUM CHLORIDE, PRESERVATIVE FREE 10 ML: 5 INJECTION INTRAVENOUS at 22:03

## 2020-12-16 RX ADMIN — METOCLOPRAMIDE 10 MG: 5 INJECTION, SOLUTION INTRAMUSCULAR; INTRAVENOUS at 23:42

## 2020-12-16 RX ADMIN — SODIUM CHLORIDE: 9 INJECTION, SOLUTION INTRAVENOUS at 07:05

## 2020-12-16 RX ADMIN — SENNOSIDES 8.6 MG: 8.6 TABLET, FILM COATED ORAL at 12:55

## 2020-12-16 RX ADMIN — SODIUM CHLORIDE, PRESERVATIVE FREE 10 ML: 5 INJECTION INTRAVENOUS at 15:42

## 2020-12-16 RX ADMIN — LUBIPROSTONE 24 MCG: 24 CAPSULE, GELATIN COATED ORAL at 17:43

## 2020-12-16 RX ADMIN — CEFTRIAXONE 1 G: 1 INJECTION, POWDER, FOR SOLUTION INTRAMUSCULAR; INTRAVENOUS at 12:41

## 2020-12-16 RX ADMIN — CARVEDILOL 6.25 MG: 6.25 TABLET, FILM COATED ORAL at 08:46

## 2020-12-16 RX ADMIN — CARVEDILOL 6.25 MG: 6.25 TABLET, FILM COATED ORAL at 17:43

## 2020-12-16 RX ADMIN — ENALAPRIL MALEATE 5 MG: 5 TABLET ORAL at 08:46

## 2020-12-16 RX ADMIN — QUETIAPINE FUMARATE 150 MG: 100 TABLET ORAL at 22:01

## 2020-12-16 RX ADMIN — ONDANSETRON 4 MG: 2 INJECTION INTRAMUSCULAR; INTRAVENOUS at 15:41

## 2020-12-16 RX ADMIN — SODIUM CHLORIDE: 9 INJECTION, SOLUTION INTRAVENOUS at 21:52

## 2020-12-16 RX ADMIN — ATORVASTATIN CALCIUM 20 MG: 20 TABLET, FILM COATED ORAL at 08:46

## 2020-12-16 RX ADMIN — MEMANTINE 10 MG: 10 TABLET ORAL at 08:46

## 2020-12-16 RX ADMIN — SODIUM PHOSPHATE, DIBASIC AND SODIUM PHOSPHATE, MONOBASIC 1 ENEMA: 7; 19 ENEMA RECTAL at 17:43

## 2020-12-16 RX ADMIN — DILTIAZEM HYDROCHLORIDE 120 MG: 120 CAPSULE, COATED, EXTENDED RELEASE ORAL at 12:55

## 2020-12-16 RX ADMIN — ENOXAPARIN SODIUM 40 MG: 40 INJECTION SUBCUTANEOUS at 08:46

## 2020-12-16 RX ADMIN — PANTOPRAZOLE SODIUM 40 MG: 40 TABLET, DELAYED RELEASE ORAL at 08:46

## 2020-12-16 RX ADMIN — MEMANTINE 10 MG: 10 TABLET ORAL at 22:03

## 2020-12-16 RX ADMIN — SODIUM CHLORIDE, PRESERVATIVE FREE 10 ML: 5 INJECTION INTRAVENOUS at 08:57

## 2020-12-16 RX ADMIN — ASPIRIN 81 MG: 81 TABLET, COATED ORAL at 08:46

## 2020-12-16 RX ADMIN — METOCLOPRAMIDE 10 MG: 5 INJECTION, SOLUTION INTRAMUSCULAR; INTRAVENOUS at 15:41

## 2020-12-16 NOTE — H&P
History and Physical      Name:  Radha Martin /Age/Sex: 1940  ([de-identified] y.o. female)   MRN & CSN:  [id number] & [id number] Admission Date/Time: 12/15/2020  7:57 PM   Location:  ED16/ED-16 PCP: Emmie Tellez MD       Hospital Day: 2    Assessment and Plan:   Radha Martin is a [de-identified] y.o.  female  who presents with Intractable vomiting with nausea    1. Bilious vomiting: No evidence of bowel obstruction. · CT of the abdomen with large hiatal hernia, large amount of fecal material at the rectal vault. · Advance diet to general, IV fluid. · Refused surgical procedure or endoscopic procedure. · Continue antiemetics. 2. Constipation: Manual disimxpaction done at the ER. Start laxatives. 3. Abdominal aortic aneurysm  4. Alzheimer's dementia: Continue medications. 5. Hypertension: Blood pressure controlled. Continue medications. 6. Hyperlipidemia: continue statin  7. CAD: continue medications. Diet No diet orders on file   DVT Prophylaxis [x] Lovenox, []  Heparin, [] SCDs, [] Warfarin  [] NOAC     GI Prophylaxis [x] PPI,  [] H2 Blocker,  [] Carafate,  [] Diet/Tube Feeds   Code Status Prior   MDM [] Low, [] Moderate,[x]  High     History of Present Illness:     Chief Complaint: Intractable vomiting with nausea  Radha Martin is a [de-identified] y.o.  female, with past medical history significant for Alzheimer's disease, hypertension, hyperlipidemia who presented to the ED from home due bilious vomiting. The present condition started 3 days prior to admission as constipation, intractable nausea and vomiting, bilious. History obtained from the daughter. She also reported poor appetite. She denied fever, cough, shortness of breath. She has history of bowel obstruction according to the daughter. Also has history of large hiatal hernia but does not want any surgical procedure. The patient was brought to the ED and was subsequently admitted.     Ten point ROS reviewed negative, unless as noted above    Objective:   No intake or output data in the 24 hours ending 12/16/20 0436   Vitals:   Vitals:    12/15/20 2008   BP: (!) 153/108   Pulse:    Temp:    SpO2:      Physical Exam:   GEN Awake female, not in respiratory distress, confused  EYES Pupils are equally round. No scleral erythema, discharge, or conjunctivitis. HENT Mucous membranes are moist. Oral pharynx without exudates, no evidence of thrush. NECK Supple, no apparent thyromegaly or masses. RESP Clear to auscultation, no wheezes, rales or rhonchi. Symmetric chest movement while on room air. CARDIO/VASC S1/S2 auscultated. Regular rate and rhythm, No JVD. Peripheral pulses equal bilaterally and palpable. GI Abdomen is soft, no tenderness, masses, or guarding. Bowel sounds present. MSK No gross joint deformities. SKIN Normal coloration, warm, dry. Past Medical History:      Past Medical History:   Diagnosis Date    Arthritis     Asthma     CAD (coronary artery disease)     Chest pain 1/2/14    Went through the ER.  Dementia ([address]     GERD (gastroesophageal reflux disease)     H/O cardiovascular stress test 12/31/13    EF 70% 1. Abnormal Lexiscan Cardiolite study revealing left anterior descending territory ischemia, but the possibility of abnormality secondary to breast artifact alone can not be excluded, hence clinical correlation is recommended. 2.Normal left ventricular function by gated scan.  H/O cardiovascular stress test 10/15/14    EF70% Normal Prefusion Study     History of exercise stress test 1/14    History of nuclear stress test 02/08/2017    lexiscan-normal,EF70%    Hx of cardiovascular stress test 10/1/2015    lexiscan-normal,EF70%    Hx of echocardiogram 10/1/2015    EF 55%. Normal chamber sizes. Normal LVSF, but abnormal diastolic function. Mild MR and TR. MIminal pulmonary HTN. Mild, but hemodynamically insignificant aortic stenosis. A small abd aortic aneurysm measuring 2.9cm.      Hypertension     Movement disorder     osteoporosis    Psychiatric problem     S/P PTCA (percutaneous transluminal coronary angioplasty) 1/2/14    1. Three-vessel coronary artery disease,LAD Disease is mild to moderate. 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 The right coronary artery has critical disease as described. 2. Normal resting hemodynamics. 3. Normal left ventricular function and wall motion, no mitral regurgitation. 4. Successful percutaneous intervention with excellent results 3.0 x 26 Resolute stent. PSHX:  has a past surgical history that includes Cholecystectomy; eye surgery; Coronary angioplasty with stent (01/02/2014); Carpal tunnel release (Bilateral); and Colonoscopy (12/14/2015). Allergies: Allergies   Allergen Reactions    Bactrim [Sulfamethoxazole-Trimethoprim] Hives    Pcn [Penicillins]        FAM HX: family history includes Arthritis in her daughter; Asthma in her daughter; Cancer in her sister; Diabetes in her brother and mother; Heart Disease in her mother; High Blood Pressure in her mother; High Cholesterol in her brother and mother; Stroke in her mother.   Soc HX:   Social History     Socioeconomic History    Marital status:      Spouse name: None    Number of children: None    Years of education: None    Highest education level: None   Occupational History    None   Social Needs    Financial resource strain: None    Food insecurity     Worry: None     Inability: None    Transportation needs     Medical: None     Non-medical: None   Tobacco Use    Smoking status: Former Smoker    Smokeless tobacco: Never Used   Substance and Sexual Activity    Alcohol use: No     Alcohol/week: 0.0 standard drinks    Drug use: No    Sexual activity: Not Currently     Partners: Male   Lifestyle    Physical activity     Days per week: None     Minutes per session: None    Stress: None   Relationships    Social connections     Talks on phone: None     Gets together: None Attends Restoration service: None     Active member of club or organization: None     Attends meetings of clubs or organizations: None     Relationship status: None    Intimate partner violence     Fear of current or ex partner: None     Emotionally abused: None     Physically abused: None     Forced sexual activity: None   Other Topics Concern    None   Social History Narrative    None       Medications:     Home Medication   Prior to Admission medications    Medication Sig Start Date End Date Taking? Authorizing Provider   LORazepam (ATIVAN) 1 MG tablet Take 1 mg by mouth every 8 hours as needed for Anxiety. Historical Provider, MD   QUEtiapine (SEROQUEL) 300 MG tablet Take 150 mg by mouth nightly    Historical Provider, MD   nitroGLYCERIN (NITROSTAT) 0.4 MG SL tablet Place 1 tablet under the tongue every 5 minutes as needed for Chest pain 1/26/17   Danielle Garay MD   enalapril (VASOTEC) 5 MG tablet Take 5 mg by mouth daily    Historical Provider, MD   memantine (NAMENDA XR) 28 MG CP24 capsule Take 28 mg by mouth daily    Historical Provider, MD   carvedilol (COREG) 6.25 MG tablet Take 6.25 mg by mouth 2 times daily (with meals). Historical Provider, MD   atorvastatin (LIPITOR) 20 MG tablet Take 20 mg by mouth daily. Historical Provider, MD   omeprazole (PRILOSEC) 40 MG capsule Take 1 capsule by mouth daily. Patient taking differently: Take 40 mg by mouth 2 times daily  10/17/14   Landon Pringle MD   donepezil (ARICEPT) 10 MG tablet Take 10 mg by mouth nightly. 10/2/14   Historical Provider, MD   diltiazem (CARDIZEM CD) 120 MG ER capsule Take 120 mg by mouth daily  10/2/14   Historical Provider, MD   aspirin EC 81 MG EC tablet Take 1 tablet by mouth daily.  4/8/14   Danielle Garay MD     Medications:    sodium chloride flush  10 mL Intravenous BID      Infusions:    sodium chloride 100 mL/hr at 12/15/20 2216     PRN Meds:     ondansetron, 4 mg, Q30 Min PRN        Recent Labs     12/15/20  2212 WBC 14.3*   HGB 14.4   HCT 40.0         Recent Labs     12/15/20  2212      K 3.8   CL 98*   CO2 28   BUN 25*   CREATININE 0.8     Recent Labs     12/15/20  2212   AST 17   ALT 13   BILITOT 0.9   ALKPHOS 104     Imaging reviewed      Electronically signed by Domitila Knapp MD on 12/16/2020 at 4:36 AM

## 2020-12-16 NOTE — CARE COORDINATION
Chart reviewed and per charting, pt has dementia and is unable to participate in discharge planning. Pts daughter, Monika Wright, is her legal guardian. CM called and spoke with Nelli to initiate discharge plan. Monika Wright states she moved in with her mom about 2 years ago and has hired private caregivers to help with her mom while she is at work. Monika Wright started to notice pt decline on Saturday but had taken her to PCP appointment last week and was supposed to have [facility] start with her mother. Monika Wright stated she has not heard from [facility] yet. Pt was sleeping on couch because she would not get into her bed, Monika Wright has a hospital bed being delivered to the home tomorrow. DME at home includes: BSC, shower chair, rolling walker and wheelchair. Pt has 2 steps to enter home if goes in front door and 1 to enter if goes in garage. Monika Wright stated that she is going to talk with her uncles about installing a ramp in the garage for the pt. Pts home is one level after entering her home. Pt has PCP and insurance with affordable RX copays. PT/OT are consulted to see pt for evals but Monika Wright wants to take pt home w/ [facility] if possible. Per Monika Wright, pt answers best to short, direct instructions. Monika Wright states that pt can not understand long sentences and is unable to answer questions. Monika Wright requested update from physician today. Perfect serve sent to Dr. Raine Chauhan with her request and contact number. CM to follow for possible needs at discharge.

## 2020-12-16 NOTE — ED NOTES
Attempted to call report at this time. Informed that the RN is indisposed (in the restroom) at this time.  To call back     Blain Ahumada, RN  12/16/20 8207

## 2020-12-16 NOTE — ED NOTES
Report given to [facility] AMINTA Martinez  12/16/20 2922

## 2020-12-16 NOTE — ED PROVIDER NOTES
Triage Chief Complaint:   Constipation (constipation x 3 days) and Emesis    [place]:  Today in the ED I had the pleasure of caring for Adnrzej Mckeon who is a [de-identified] y.o. female that presents today to the emergency department for constipation nausea vomiting. Context is patient is demented lives at home with daughter. Patient has been constipated x3 days nausea vomiting x2 days got significantly worse today daughter states that she believes the patient was vomiting feces. Patient herself is a very poor historian. Patient has had a bowel obstruction in the past per daughter. ROS:  REVIEW OF SYSTEMS    Please see HPI history significantly limited secondary to patient's dementia      All other review of systems are negative  See HPI and nursing notes for additional information       Past Medical History:   Diagnosis Date    Arthritis     Asthma     CAD (coronary artery disease)     Chest pain 1/2/14    Went through the ER.  Dementia ([address]     GERD (gastroesophageal reflux disease)     H/O cardiovascular stress test 12/31/13    EF 70% 1. Abnormal Lexiscan Cardiolite study revealing left anterior descending territory ischemia, but the possibility of abnormality secondary to breast artifact alone can not be excluded, hence clinical correlation is recommended. 2.Normal left ventricular function by gated scan.  H/O cardiovascular stress test 10/15/14    EF70% Normal Prefusion Study     History of exercise stress test 1/14    History of nuclear stress test 02/08/2017    lexiscan-normal,EF70%    Hx of cardiovascular stress test 10/1/2015    lexiscan-normal,EF70%    Hx of echocardiogram 10/1/2015    EF 55%. Normal chamber sizes. Normal LVSF, but abnormal diastolic function. Mild MR and TR. MIminal pulmonary HTN. Mild, but hemodynamically insignificant aortic stenosis. A small abd aortic aneurysm measuring 2.9cm.      Hypertension     Movement disorder     osteoporosis    Psychiatric problem     S/P PTCA (percutaneous transluminal coronary angioplasty) 1/2/14    1. Three-vessel coronary artery disease,LAD Disease is mild to moderate. 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 The right coronary artery has critical disease as described. 2. Normal resting hemodynamics. 3. Normal left ventricular function and wall motion, no mitral regurgitation. 4. Successful percutaneous intervention with excellent results 3.0 x 26 Resolute stent. Past Surgical History:   Procedure Laterality Date    CARPAL TUNNEL RELEASE Bilateral     CHOLECYSTECTOMY      COLONOSCOPY  12/14/2015    sigmoid diverticulosis, non bleeding internal hemorrhoids    CORONARY ANGIOPLASTY WITH STENT PLACEMENT  01/02/2014    angioplasty and stenting of RCA, three vessed CAD, LAD has mild to mod, CX has mild disease.     EYE SURGERY      cataract     Family History   Problem Relation Age of Onset    Diabetes Mother     Heart Disease Mother     High Blood Pressure Mother     High Cholesterol Mother     Stroke Mother     Cancer Sister     Diabetes Brother     High Cholesterol Brother     Arthritis Daughter     Asthma Daughter      Social History     Socioeconomic History    Marital status:      Spouse name: Not on file    Number of children: Not on file    Years of education: Not on file    Highest education level: Not on file   Occupational History    Not on file   Social Needs    Financial resource strain: Not on file    Food insecurity     Worry: Not on file     Inability: Not on file   [place] needs     Medical: Not on file     Non-medical: Not on file   Tobacco Use    Smoking status: Former Smoker    Smokeless tobacco: Never Used   Substance and Sexual Activity    Alcohol use: No     Alcohol/week: 0.0 standard drinks    Drug use: No    Sexual activity: Not Currently     Partners: Male   Lifestyle    Physical activity     Days per week: Not on file     Minutes per session: Not on file    Stress: Not on file   Relationships    Social connections     Talks on phone: Not on file     Gets together: Not on file     Attends Cheondoism service: Not on file     Active member of club or organization: Not on file     Attends meetings of clubs or organizations: Not on file     Relationship status: Not on file    Intimate partner violence     Fear of current or ex partner: Not on file     Emotionally abused: Not on file     Physically abused: Not on file     Forced sexual activity: Not on file   Other Topics Concern    Not on file   Social History Narrative    Not on file     Current Facility-Administered Medications   Medication Dose Route Frequency Provider Last Rate Last Admin    0.9 % sodium chloride infusion   Intravenous Continuous Stephens Drape, PA-C 100 mL/hr at 12/15/20 2216 New Bag at 12/15/20 2216    ondansetron (ZOFRAN) injection 4 mg  4 mg Intravenous Q30 Min PRN Blanquita Drape, PA-C        sodium chloride flush 0.9 % injection 10 mL  10 mL Intravenous BID Stephens Drape, PA-C   10 mL at 12/15/20 2347     Current Outpatient Medications   Medication Sig Dispense Refill    LORazepam (ATIVAN) 1 MG tablet Take 1 mg by mouth every 8 hours as needed for Anxiety.  QUEtiapine (SEROQUEL) 300 MG tablet Take 150 mg by mouth nightly      nitroGLYCERIN (NITROSTAT) 0.4 MG SL tablet Place 1 tablet under the tongue every 5 minutes as needed for Chest pain 100 tablet 1    enalapril (VASOTEC) 5 MG tablet Take 5 mg by mouth daily      memantine (NAMENDA XR) 28 MG CP24 capsule Take 28 mg by mouth daily      carvedilol (COREG) 6.25 MG tablet Take 6.25 mg by mouth 2 times daily (with meals).  atorvastatin (LIPITOR) 20 MG tablet Take 20 mg by mouth daily.  omeprazole (PRILOSEC) 40 MG capsule Take 1 capsule by mouth daily. (Patient taking differently: Take 40 mg by mouth 2 times daily ) 30 capsule 3    donepezil (ARICEPT) 10 MG tablet Take 10 mg by mouth nightly.       diltiazem (CARDIZEM CD) 120 MG ER capsule Take 120 mg by mouth daily       aspirin EC 81 MG EC tablet Take 1 tablet by mouth daily. 30 tablet 0     Allergies   Allergen Reactions    Bactrim [Sulfamethoxazole-Trimethoprim] Hives    Pcn [Penicillins]        Nursing Notes Reviewed    Physical Exam:  ED Triage Vitals   Enc Vitals Group      BP 12/15/20 2008 (!) 153/108      Pulse 12/15/20 2004 93      Resp --       Temp 12/15/20 2004 97.9 °F (36.6 °C)      Temp Source 12/15/20 2004 Axillary      SpO2 12/15/20 2004 94 %      Weight 12/15/20 2004 135 lb (61.2 kg)      Height --       Head Circumference --       Peak Flow --       Pain Score --       Pain Loc --       Pain Edu? --       Excl. in [address]? --      General :Patient is awake alert oriented person place and time no acute distress nontoxic appearing pleasantly demented female. Actively vomiting. HEENT: Pupils are equally round and reactive to light extraocular motors are intact conjunctivae clear sclerae white there is no injection no icterus. Nose without any rhinorrhea or epistaxis. Oral mucosa is moist no exudate buccal mucosa shows no ulcerations. Uvula is midline    Neck: Neck is supple full range of motion trachea midline thyroid nonpalpable  Cardiac: Heart regular rate rhythm no murmurs rubs clicks or gallops  Lungs: Lungs are clear to auscultation there is no wheezing rhonchi or rales. There is no use of accessory muscles no nasal flaring identified. Chest wall: There is no tenderness to palpation over the chest wall or over ribs  Abdomen: Abdomen is soft nontender nondistended. There is no firm or pulsatile masses no rebound rigidity or guarding negative Carty's negative McBurney, no peritoneal signs  Suprapubic:  there is no tenderness to palpation over the external bladder   Musculoskeletal: 5 out of 5 strength in all 4 extremities full flexion extension abduction and adduction supination pronation of all extremities and all digits.  No obvious muscle atrophy is noted.  No focal muscle deficits are appreciated  Dermatology: Skin is warm and dry there is no obvious abscesses lacerations or lesions noted  Psych: Mentation is demented    I have reviewed and interpreted all of the currently available lab results from this visit (if applicable):  Results for orders placed or performed during the hospital encounter of 12/15/20   CBC Auto Differential   Result Value Ref Range    WBC 14.3 (H) 4.0 - 10.5 K/CU MM    RBC 4.35 4.2 - 5.4 M/CU MM    Hemoglobin 14.4 12.5 - 16.0 GM/DL    Hematocrit 40.0 37 - 47 %    MCV 92.0 78 - 100 FL    MCH 33.1 (H) 27 - 31 PG    MCHC 36.0 32.0 - 36.0 %    RDW 12.2 11.7 - 14.9 %    Platelets 245 424 - 548 K/CU MM    MPV 10.3 7.5 - 11.1 FL    Differential Type AUTOMATED DIFFERENTIAL     Segs Relative 89.5 (H) 36 - 66 %    Lymphocytes % 5.0 (L) 24 - 44 %    Monocytes % 4.8 (H) 0 - 4 %    Eosinophils % 0.0 0 - 3 %    Basophils % 0.1 0 - 1 %    Segs Absolute 12.8 K/CU MM    Lymphocytes Absolute 0.7 K/CU MM    Monocytes Absolute 0.7 K/CU MM    Eosinophils Absolute 0.0 K/CU MM    Basophils Absolute 0.0 K/CU MM    Nucleated RBC % 0.0 %    Total Nucleated RBC 0.0 K/CU MM    Total Immature Neutrophil 0.08 K/CU MM    Immature Neutrophil % 0.6 (H) 0 - 0.43 %   Comprehensive Metabolic Panel   Result Value Ref Range    Sodium 139 135 - 145 MMOL/L    Potassium 3.8 3.5 - 5.1 MMOL/L    Chloride 98 (L) 99 - 110 mMol/L    CO2 28 21 - 32 MMOL/L    BUN 25 (H) 6 - 23 MG/DL    CREATININE 0.8 0.6 - 1.1 MG/DL    Glucose 180 (H) 70 - 99 MG/DL    Calcium 10.0 8.3 - 10.6 MG/DL    Alb 3.9 3.4 - 5.0 GM/DL    Total Protein 6.6 6.4 - 8.2 GM/DL    Total Bilirubin 0.9 0.0 - 1.0 MG/DL    ALT 13 10 - 40 U/L    AST 17 15 - 37 IU/L    Alkaline Phosphatase 104 40 - 129 IU/L    GFR Non-African American >60 >60 mL/min/1.73m2    GFR African American >60 >60 mL/min/1.73m2    Anion Gap 13 4 - 16   HCG, Quantitative, Pregnancy   Result Value Ref Range    hCG Quant 2.2 UIU/ML   Lipase Result Value Ref Range    Lipase 15 13 - 60 IU/L      Radiographs (if obtained):  [] The following radiograph was interpreted by myself in the absence of a radiologist:   [] Radiologist's Report Reviewed:  CT ABDOMEN PELVIS W IV CONTRAST   Final Result   Large hiatal hernia with apparent thickening at the gastroesophageal   junction. Upper endoscopy can be considered. No acute findings in the abdomen or pelvis. Abdominal aortic aneurysm, 2.7 cm. RECOMMENDATIONS:   For management of fusiform AAA:      2.6-2.9 cm aorta, recommend follow-up every 5 years or aortas meeting the   criteria for AAA (>1.5 x proximal normal segment; no f/u if < 1.5 x proximal   normal segment; no f/u for aortas < 2.6 cm). * For management of saccular abdominal aortic aneurysms of any size,   recommend vascular consultation. Note:  For AAA enlargement of >0.5 cm in 6 months or >1 cm in 1 year,   recommend vascular consultation. References: Candi Romoins Radiol 2013; 42(46):780-433; J Vasc Surg. 2018; 67:2-77             EKG (if obtained):   Please See Note of attending physician for EKG interpretation. Chart review shows recent radiograph(s):  Ct Abdomen Pelvis W Iv Contrast    Result Date: 12/15/2020  EXAMINATION: CT OF THE ABDOMEN AND PELVIS WITH CONTRAST 12/15/2020 11:39 pm TECHNIQUE: CT of the abdomen and pelvis was performed with the administration of intravenous contrast. Multiplanar reformatted images are provided for review. Dose modulation, iterative reconstruction, and/or weight based adjustment of the mA/kV was utilized to reduce the radiation dose to as low as reasonably achievable. COMPARISON: 07/25/2020 HISTORY: ORDERING SYSTEM PROVIDED HISTORY: abdominal pain TECHNOLOGIST PROVIDED HISTORY: IV contrast only. Thank you. Reason for exam:->abdominal pain Reason for exam:->IV contrast only. Thank you. FINDINGS: Lower Chest: Small bilateral pleural effusions.   Large hiatal hernia with apparent thickening of the gastroesophageal junction. Organs: Cholecystectomy. Simple left lobe liver cyst again noted. No acute abnormality of the enhanced liver, spleen, adrenal glands, pancreas. The kidneys appear normal in size and demonstrate symmetric enhancement. No focal renal mass. No hydronephrosis. No perinephric stranding. GI/Bowel: No evidence of bowel obstruction or bowel wall thickening. Normal appendix visualized. Large amount of fecal material in the rectal vault. Pelvis: The urinary bladder appears unremarkable. The pelvic organs demonstrate no acute abnormality. Peritoneum/Retroperitoneum: Extensive atherosclerotic disease of the aorta with eccentric mural thrombus. Mild aneurysmal dilatation, 2.7 cm maximally, infrarenal.  No fluid collection or free air. No gross lymphadenopathy. Bones/Soft Tissues: No acute findings. Large hiatal hernia with apparent thickening at the gastroesophageal junction. Upper endoscopy can be considered. No acute findings in the abdomen or pelvis. Abdominal aortic aneurysm, 2.7 cm. RECOMMENDATIONS: For management of fusiform AAA: 2.6-2.9 cm aorta, recommend follow-up every 5 years or aortas meeting the criteria for AAA (>1.5 x proximal normal segment; no f/u if < 1.5 x proximal normal segment; no f/u for aortas < 2.6 cm). * For management of saccular abdominal aortic aneurysms of any size, recommend vascular consultation. Note:  For AAA enlargement of >0.5 cm in 6 months or >1 cm in 1 year, recommend vascular consultation. References: Mary Florentino Radiol 2013; 32(39):982-727; J Vasc Surg. 2018; 67:2-77       MDM:   Abdominal exam benign on initial repeat examinations patient presents today to the emergency department for nausea vomiting. Concern for vomiting of fecal matter. I personally did not witness patient vomiting fecal matter. However I did speak to nurses. Who did state that patient was indeed vomiting fecal matter.   Patient CT scan revealed no acute abnormalities but did have a large amount of stool in the rectum. I did do a manual disimpaction. With nurse at bedside. And did get significantly large amount of stool out of the rectal vault. However given patient's history. Of fecal vomitus. Patient will require admission to the hospital for further inpatient evaluation and management. I estimate there is LOW risk for (including but not limited to) ACUTE APPENDICITIS, BOWEL OBSTRUCTION, ACUTE CHOLECYSTITIS, RUPTURED DIVERTICULITIS, INCARCERATED or STRANGULATED HERNIA, HEMMORHAGIC PANCREATITIS, or PERFORATED BOWEL/ULCER    On-call physicianWas consulted regarding patient. After thorough discussion regarding patient's history, physical exam.  laboratory values, radiographic evidence (if applicable  theymyself as well as my attending physician agreed Given the patient's presenting concerns, medical history and clinical findings, the patient will be admitted at this time to undergo further evaluation and disposition. . During patient's entire stay in the ED patient remained stable and comfortable. Analgesia is well-controlled. Patient will be admitted for all information regarding ongoing management and care of patient please see note of Admitting physician. All EKG interpretations are performed by Attending physician     I independently managed patient today in the ED      BP (!) 153/108   Pulse 93   Temp 97.9 °F (36.6 °C) (Axillary)   Wt 135 lb (61.2 kg)   SpO2 94%   BMI 26.37 kg/m²       Clinical Impression:  1. Non-intractable vomiting with nausea, unspecified vomiting type    2. Feculent vomit on examination        Disposition referral (if applicable):  No follow-up provider specified.   Disposition medications (if applicable):  New Prescriptions    No medications on file         Comment: Please note this report has been produced using speech recognition software and may contain errors related to that system including errors in grammar, punctuation, and spelling, as well as words and phrases that may be inappropriate. If there are any questions or concerns please feel free to contact the dictating provider for clarification.       Christie Flynn, [phone] [place] [address]  12/16/20 [id number]

## 2020-12-16 NOTE — CONSULTS
[facility]  Gastroenterology Consultation    2020  11:20 AM    Patient:    Jose Steele  : 1940   [de-identified] y.o. MRN: [id number]  Admitted: 12/15/2020  7:57 PM ATT: Francisco J Wild MD   [id number]/[id number]-A  AdmitDx: Feculent vomit on examination [R11.13]  Intractable vomiting with nausea [R11.2]  Non-intractable vomiting with nausea, unspecified vomiting type [R11.2]  PCP: Gerson Sung MD    Reason for Consult: large hiatal hernia with abdominal symptoms    Requesting Physician:  Francisco J Wild MD    History Obtained From:  Patient and review of all records    HISTORY OF PRESENT ILLNESS:                The patient is a [de-identified] y.o. female with significant   Past Medical History:   Diagnosis Date    Arthritis     Asthma     CAD (coronary artery disease)     Chest pain 14    Went through the ER.  Dementia ([address]     GERD (gastroesophageal reflux disease)     H/O cardiovascular stress test 13    EF 70% 1. Abnormal Lexiscan Cardiolite study revealing left anterior descending territory ischemia, but the possibility of abnormality secondary to breast artifact alone can not be excluded, hence clinical correlation is recommended. 2.Normal left ventricular function by gated scan.  H/O cardiovascular stress test 10/15/14    EF70% Normal Prefusion Study     History of exercise stress test     History of nuclear stress test 2017    lexiscan-normal,EF70%    Hx of cardiovascular stress test 10/1/2015    lexiscan-normal,EF70%    Hx of echocardiogram 10/1/2015    EF 55%. Normal chamber sizes. Normal LVSF, but abnormal diastolic function. Mild MR and TR. MIminal pulmonary HTN. Mild, but hemodynamically insignificant aortic stenosis. A small abd aortic aneurysm measuring 2.9cm.  Hypertension     Movement disorder     osteoporosis    Psychiatric problem     S/P PTCA (percutaneous transluminal coronary angioplasty) 14    1. Three-vessel coronary artery disease,LAD Disease is mild to moderate. 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 The right coronary artery has critical disease as described. 2. Normal resting hemodynamics. 3. Normal left ventricular function and wall motion, no mitral regurgitation. 4. Successful percutaneous intervention with excellent results 3.0 x 26 Resolute stent. who presented to [facility] ED with c/o  Nausea and vomiting starting 1 day ago. Hx obtained from daughter, david poor historian d/t alzheimer's dementia. daughter reports pt has \"really bad acid reflux\" for several years, pt was pointing to her chest yesterday ,daughter gave her some Omeprazole yesterday thinking she was having reflux. Takes omeprazole BID at home. Emesis described as black, feces, foul smelling. Emesis was black at least 5 times yesterday, foul smelling. Low PO intake for the past week. No BM for several days. Had some brown  liquid stool about 5 days ago . NO dysphagia. History of EGD in past, unsure when, had dilation per daughter   History of colonoscopy 12/14/2015, unsure results     ASA 81 mg daily   NO NSAIDs  No Anti-platelet therapy    Past Smoker  NO Alcohol intake    Past Medical History:        Diagnosis Date    Arthritis     Asthma     CAD (coronary artery disease)     Chest pain 1/2/14    Went through the ER.  Dementia ([address]     GERD (gastroesophageal reflux disease)     H/O cardiovascular stress test 12/31/13    EF 70% 1. Abnormal Lexiscan Cardiolite study revealing left anterior descending territory ischemia, but the possibility of abnormality secondary to breast artifact alone can not be excluded, hence clinical correlation is recommended. 2.Normal left ventricular function by gated scan.     H/O cardiovascular stress test 10/15/14    EF70% Normal Prefusion Study     History of exercise stress test 1/14    History of nuclear stress test 02/08/2017    lexiscan-normal,EF70%    Hx of cardiovascular stress test 10/1/2015 lexiscan-normal,EF70%    Hx of echocardiogram 10/1/2015    EF 55%. Normal chamber sizes. Normal LVSF, but abnormal diastolic function. Mild MR and TR. MIminal pulmonary HTN. Mild, but hemodynamically insignificant aortic stenosis. A small abd aortic aneurysm measuring 2.9cm.  Hypertension     Movement disorder     osteoporosis    Psychiatric problem     S/P PTCA (percutaneous transluminal coronary angioplasty) 1/2/14    1. Three-vessel coronary artery disease,LAD Disease is mild to moderate. 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 The right coronary artery has critical disease as described. 2. Normal resting hemodynamics. 3. Normal left ventricular function and wall motion, no mitral regurgitation. 4. Successful percutaneous intervention with excellent results 3.0 x 26 Resolute stent. Past Surgical History:        Procedure Laterality Date    CARPAL TUNNEL RELEASE Bilateral     CHOLECYSTECTOMY      COLONOSCOPY  12/14/2015    sigmoid diverticulosis, non bleeding internal hemorrhoids    CORONARY ANGIOPLASTY WITH STENT PLACEMENT  01/02/2014    angioplasty and stenting of RCA, three vessed CAD, LAD has mild to mod, CX has mild disease.     EYE SURGERY      cataract       Current Medications:    Medications    Scheduled Medications:    sodium chloride flush  10 mL Intravenous 2 times per day    enoxaparin  40 mg Subcutaneous Daily    aspirin EC  81 mg Oral Daily    atorvastatin  20 mg Oral Daily    carvedilol  6.25 mg Oral BID WC    dilTIAZem  120 mg Oral Daily    donepezil  10 mg Oral Nightly    enalapril  5 mg Oral Daily    QUEtiapine  150 mg Oral Nightly    pantoprazole  40 mg Oral QAM AC    senna  1 tablet Oral BID    memantine  10 mg Oral BID    cefTRIAXone (ROCEPHIN) IV  1 g Intravenous Q24H    sodium chloride flush  10 mL Intravenous BID     PRN Medications: sodium chloride flush, promethazine **OR** ondansetron, polyethylene glycol, acetaminophen **OR** acetaminophen    Allergies:  Bactrim [sulfamethoxazole-trimethoprim] and Pcn [penicillins]    Social History:   TOBACCO:   reports that she has quit smoking. She has never used smokeless tobacco.  ETOH:   reports no history of alcohol use. Family History:       Problem Relation Age of Onset    Diabetes Mother     Heart Disease Mother     High Blood Pressure Mother     High Cholesterol Mother     Stroke Mother     Cancer Sister     Diabetes Brother     High Cholesterol Brother     Arthritis Daughter     Asthma Daughter      No family history of colon cancer, Crohn's disease, or ulcerative colitis. Sister had breast ca, mother had uterine ca     REVIEW OF SYSTEMS:    The positive ROS will be identified in bold, otherwise ROS are negative     CONSTITUTIONAL:  Neg   Recent weight changes, fatigue, fever, chills or night sweats  EYES:  Neg  Blurriness, earing, itching or acute change in vision  EARS:  Neg  hearing loss, tinnitus, vertigo, discharge or earache. NOSE:  Neg  Rhinorrhea, sneezing, itching, allergy or epistaxis  MOUTH/THROAT:  Neg  bleeding gums, hoarseness or sore throat. RESPIRATORY:   Neg SOB, wheeze, cough, sputum, hemoptysis or bronchitis  CARDIOVASCULAR:  Neg chest pain, palpitations, dyspnea on exertion, orthopnea, paroxysmal nocturnal dyspnea or edema  GASTROINTESTINAL:  SEE HPI  GENITOURINARY:  Neg  Urinary frequency, hesitancy, urgency, polyuria, dysuria, hematuria, nocturia, or incontinence. HEMATOLOGIC/LYMPHATIC:  Neg  Anemia, bleeding tendency  MUSCULOSKELETAL:    New myalgias, bone pain, joint pain, swelling or stiffness and has had change in gait. NEUROLOGICAL:  Neg  Loss of Consciousness, memory loss, forgetfulness, periods of confusion, difficulty concentrating, seizures, decline in intellect, nervousness, insomina, aphasia or dysarthria. SKIN:  Neg  skin or hair changes, and has no itching, rashes, or sores.   PSYCHIATRIC:  Neg depression, personality changes, 12/16/20 1120   sodium chloride 75 mL/hr at 12/16/20 6662       Imaging Studies: Reviewed     CT-scan of abdomen and pelvis    Large hiatal hernia with apparent thickening at the gastroesophageal   junction.  Upper endoscopy can be considered.       No acute findings in the abdomen or pelvis.       Abdominal aortic aneurysm, 2.7 cm. IMPRESSION:      Patient Active Problem List   Diagnosis Code    Chest pain R07.9    Renal insufficiency N28.9    GERD (gastroesophageal reflux disease) K21.9    Hypercholesterolemia E78.00    Anxiety F41.9    Hypertension I10    Osteoarthritis M19.90    CAD (coronary artery disease) I25.10    Intractable vomiting with nausea R11.2       ASSESSMENT/RECOMMENDATIONS:    N/V:  May be s/t large HH, uncontrolled GERD, gastroparesis  May be exacerbated by Constipation  Recommend Reglan 10 mg TID x 5 days  Zofran 4 mg TID x 2 days   Recommend Protonix 40 mg IV BID   CT abd pelvis with large hiatal hernia, apparent thickening at GEJ  large fecal matter rectal vault  Recommend clear liquid diet, adv to mechanical soft as tolerated     Constipation:  May be secondary decreased mobility  May be sec to medications  Digital disimpaction in ED, Start bowel regimen Fleet enema BID x 3 days  Senna 2 tabs at HS    Amitiza 24  mcg BID   Reglan TID for 5 days    Continue symptomatic management at this time, EGD at a later date      Discussed plan of care with patient, Jcarlos CRUZ, and pt daughter    Patient clinical, biochemical, and radiological information discussed with Dr. Ryan Mills. He agrees with the assessment and plan. Angelica Alvarez CNP  12/16/2020  11:20 AM     Patient seen and examined  abd soft  Agree with reglan and amitiza  EGD Dion MORENO have seen and examined this patient personally, and independently of the nurse practitioner. The plan was developed mutually at the time of the visit with the patient.   Tien López and myself have spoken with patient, nursing staff and provided written and verbal instructions .     The above note has been reviewed and I agree with the Assessment,  Diagnosis, and Treatment plan as suggested by [facility]

## 2020-12-16 NOTE — PROGRESS NOTES
Patient admitted earlier this morning, just arrived to the floor- awake but not interactive- per nurse had disimpaction in ED with good result. Noted CT findings with large hiatal hernia and endoscopy being recommended. Unclear baseline mentation though has documented dementia. Tried to call listed family members and no one answered. Laxatives scheduled, advance diet and consult GI for input.   -UA with possible UTI- start rocephin

## 2020-12-16 NOTE — ED NOTES
Pt to ED via [facility] for c/o constipation x 3 days, started vomiting today. Pt lives at home with daughter Makayla Minor. Has dementia at baseline.      Brett Salazar RN  12/15/20 2001

## 2020-12-17 LAB
ALBUMIN SERPL-MCNC: 3 GM/DL (ref 3.4–5)
ALP BLD-CCNC: 69 IU/L (ref 40–128)
ALT SERPL-CCNC: 8 U/L (ref 10–40)
ANION GAP SERPL CALCULATED.3IONS-SCNC: 12 MMOL/L (ref 4–16)
AST SERPL-CCNC: 14 IU/L (ref 15–37)
BASOPHILS ABSOLUTE: 0 K/CU MM
BASOPHILS RELATIVE PERCENT: 0.4 % (ref 0–1)
BILIRUB SERPL-MCNC: 0.4 MG/DL (ref 0–1)
BUN BLDV-MCNC: 30 MG/DL (ref 6–23)
CALCIUM SERPL-MCNC: 7.6 MG/DL (ref 8.3–10.6)
CHLORIDE BLD-SCNC: 107 MMOL/L (ref 99–110)
CO2: 24 MMOL/L (ref 21–32)
CREAT SERPL-MCNC: 0.9 MG/DL (ref 0.6–1.1)
CULTURE: ABNORMAL
CULTURE: ABNORMAL
DIFFERENTIAL TYPE: ABNORMAL
EOSINOPHILS ABSOLUTE: 0.2 K/CU MM
EOSINOPHILS RELATIVE PERCENT: 2.2 % (ref 0–3)
GFR AFRICAN AMERICAN: >60 ML/MIN/1.73M2
GFR NON-AFRICAN AMERICAN: >60 ML/MIN/1.73M2
GLUCOSE BLD-MCNC: 98 MG/DL (ref 70–99)
HCT VFR BLD CALC: 32.1 % (ref 37–47)
HEMOGLOBIN: 11.3 GM/DL (ref 12.5–16)
IMMATURE NEUTROPHIL %: 0.6 % (ref 0–0.43)
LYMPHOCYTES ABSOLUTE: 2 K/CU MM
LYMPHOCYTES RELATIVE PERCENT: 25.6 % (ref 24–44)
Lab: ABNORMAL
MAGNESIUM: 1.9 MG/DL (ref 1.8–2.4)
MCH RBC QN AUTO: 33.3 PG (ref 27–31)
MCHC RBC AUTO-ENTMCNC: 35.2 % (ref 32–36)
MCV RBC AUTO: 94.7 FL (ref 78–100)
MONOCYTES ABSOLUTE: 0.6 K/CU MM
MONOCYTES RELATIVE PERCENT: 8.2 % (ref 0–4)
NUCLEATED RBC %: 0 %
PDW BLD-RTO: 12.5 % (ref 11.7–14.9)
PLATELET # BLD: 131 K/CU MM (ref 140–440)
PMV BLD AUTO: 10.2 FL (ref 7.5–11.1)
POTASSIUM SERPL-SCNC: 3.1 MMOL/L (ref 3.5–5.1)
RBC # BLD: 3.39 M/CU MM (ref 4.2–5.4)
REASON FOR REJECTION: NORMAL
REASON FOR REJECTION: NORMAL
REJECTED TEST: NORMAL
REJECTED TEST: NORMAL
SEGMENTED NEUTROPHILS ABSOLUTE COUNT: 4.9 K/CU MM
SEGMENTED NEUTROPHILS RELATIVE PERCENT: 63 % (ref 36–66)
SODIUM BLD-SCNC: 143 MMOL/L (ref 135–145)
SPECIMEN: ABNORMAL
TOTAL IMMATURE NEUTOROPHIL: 0.05 K/CU MM
TOTAL NUCLEATED RBC: 0 K/CU MM
TOTAL PROTEIN: 4.8 GM/DL (ref 6.4–8.2)
WBC # BLD: 7.7 K/CU MM (ref 4–10.5)

## 2020-12-17 PROCEDURE — 96376 TX/PRO/DX INJ SAME DRUG ADON: CPT

## 2020-12-17 PROCEDURE — G0378 HOSPITAL OBSERVATION PER HR: HCPCS

## 2020-12-17 PROCEDURE — 85014 HEMATOCRIT: CPT

## 2020-12-17 PROCEDURE — 6360000002 HC RX W HCPCS: Performed by: NURSE PRACTITIONER

## 2020-12-17 PROCEDURE — 6360000002 HC RX W HCPCS: Performed by: HOSPITALIST

## 2020-12-17 PROCEDURE — 85018 HEMOGLOBIN: CPT

## 2020-12-17 PROCEDURE — 80053 COMPREHEN METABOLIC PANEL: CPT

## 2020-12-17 PROCEDURE — 83735 ASSAY OF MAGNESIUM: CPT

## 2020-12-17 PROCEDURE — 2580000003 HC RX 258: Performed by: HOSPITALIST

## 2020-12-17 PROCEDURE — 6360000002 HC RX W HCPCS: Performed by: INTERNAL MEDICINE

## 2020-12-17 PROCEDURE — 36415 COLL VENOUS BLD VENIPUNCTURE: CPT

## 2020-12-17 PROCEDURE — 96372 THER/PROPH/DIAG INJ SC/IM: CPT

## 2020-12-17 PROCEDURE — 2580000003 HC RX 258: Performed by: INTERNAL MEDICINE

## 2020-12-17 PROCEDURE — 6370000000 HC RX 637 (ALT 250 FOR IP): Performed by: NURSE PRACTITIONER

## 2020-12-17 PROCEDURE — 94761 N-INVAS EAR/PLS OXIMETRY MLT: CPT

## 2020-12-17 PROCEDURE — 85025 COMPLETE CBC W/AUTO DIFF WBC: CPT

## 2020-12-17 PROCEDURE — 6370000000 HC RX 637 (ALT 250 FOR IP): Performed by: INTERNAL MEDICINE

## 2020-12-17 PROCEDURE — 96366 THER/PROPH/DIAG IV INF ADDON: CPT

## 2020-12-17 PROCEDURE — 84132 ASSAY OF SERUM POTASSIUM: CPT

## 2020-12-17 RX ORDER — POTASSIUM CHLORIDE 7.45 MG/ML
10 INJECTION INTRAVENOUS PRN
Status: DISCONTINUED | OUTPATIENT
Start: 2020-12-17 | End: 2020-12-20

## 2020-12-17 RX ADMIN — MEMANTINE 10 MG: 10 TABLET ORAL at 21:57

## 2020-12-17 RX ADMIN — LUBIPROSTONE 24 MCG: 24 CAPSULE, GELATIN COATED ORAL at 07:56

## 2020-12-17 RX ADMIN — ONDANSETRON 4 MG: 2 INJECTION INTRAMUSCULAR; INTRAVENOUS at 14:51

## 2020-12-17 RX ADMIN — SODIUM CHLORIDE, PRESERVATIVE FREE 10 ML: 5 INJECTION INTRAVENOUS at 21:57

## 2020-12-17 RX ADMIN — CARVEDILOL 6.25 MG: 6.25 TABLET, FILM COATED ORAL at 07:56

## 2020-12-17 RX ADMIN — DONEPEZIL HYDROCHLORIDE 10 MG: 10 TABLET, FILM COATED ORAL at 21:57

## 2020-12-17 RX ADMIN — ENALAPRIL MALEATE 5 MG: 5 TABLET ORAL at 08:00

## 2020-12-17 RX ADMIN — ENOXAPARIN SODIUM 40 MG: 40 INJECTION SUBCUTANEOUS at 07:55

## 2020-12-17 RX ADMIN — QUETIAPINE FUMARATE 150 MG: 100 TABLET ORAL at 21:57

## 2020-12-17 RX ADMIN — MEMANTINE 10 MG: 10 TABLET ORAL at 07:56

## 2020-12-17 RX ADMIN — CEFTRIAXONE 1 G: 1 INJECTION, POWDER, FOR SOLUTION INTRAMUSCULAR; INTRAVENOUS at 11:50

## 2020-12-17 RX ADMIN — ONDANSETRON 4 MG: 2 INJECTION INTRAMUSCULAR; INTRAVENOUS at 21:57

## 2020-12-17 RX ADMIN — DILTIAZEM HYDROCHLORIDE 120 MG: 120 CAPSULE, COATED, EXTENDED RELEASE ORAL at 07:56

## 2020-12-17 RX ADMIN — ATORVASTATIN CALCIUM 20 MG: 20 TABLET, FILM COATED ORAL at 07:56

## 2020-12-17 RX ADMIN — METOCLOPRAMIDE 10 MG: 5 INJECTION, SOLUTION INTRAMUSCULAR; INTRAVENOUS at 22:06

## 2020-12-17 RX ADMIN — CARVEDILOL 6.25 MG: 6.25 TABLET, FILM COATED ORAL at 17:07

## 2020-12-17 RX ADMIN — LUBIPROSTONE 24 MCG: 24 CAPSULE, GELATIN COATED ORAL at 17:07

## 2020-12-17 RX ADMIN — PANTOPRAZOLE SODIUM 40 MG: 40 TABLET, DELAYED RELEASE ORAL at 07:56

## 2020-12-17 RX ADMIN — METOCLOPRAMIDE 10 MG: 5 INJECTION, SOLUTION INTRAMUSCULAR; INTRAVENOUS at 14:51

## 2020-12-17 RX ADMIN — ONDANSETRON 4 MG: 2 INJECTION INTRAMUSCULAR; INTRAVENOUS at 07:55

## 2020-12-17 RX ADMIN — ASPIRIN 81 MG: 81 TABLET, COATED ORAL at 07:55

## 2020-12-17 ASSESSMENT — PAIN DESCRIPTION - DESCRIPTORS: DESCRIPTORS: ACHING

## 2020-12-17 ASSESSMENT — PAIN DESCRIPTION - LOCATION: LOCATION: CHEST

## 2020-12-17 ASSESSMENT — PAIN SCALES - GENERAL
PAINLEVEL_OUTOF10: 0
PAINLEVEL_OUTOF10: 2

## 2020-12-17 ASSESSMENT — PAIN - FUNCTIONAL ASSESSMENT: PAIN_FUNCTIONAL_ASSESSMENT: PREVENTS OR INTERFERES SOME ACTIVE ACTIVITIES AND ADLS

## 2020-12-17 ASSESSMENT — PAIN DESCRIPTION - ORIENTATION: ORIENTATION: RIGHT

## 2020-12-17 NOTE — PROGRESS NOTES
Pt had large loose BM. There is a present ordered fleet enema. Nurse consulted Hospitalist if it is needed.

## 2020-12-17 NOTE — PROGRESS NOTES
Dane Hunter MD, Vinay Giles                Internal Medicine Hospitalist             Daily Progress  Note   Subjective:     Chief Complaint   Patient presents with    Constipation     constipation x 3 days    Emesis     Ms. Mock Complains of nil, no more vomiting noted. Objective:    /68   Pulse 64   Temp 98.4 °F (36.9 °C) (Oral)   Resp 16   Wt 122 lb 14.4 oz (55.7 kg)   SpO2 95%   BMI 24.00 kg/m²    No intake or output data in the 24 hours ending 12/17/20 1526   Physical Exam:  Heart:  Distant heart sounds. Lungs: Mostly clear to auscultation, decreased breath sounds at bases. No wheezes appreciated no crackles heard. Poor effort. Abdomen: Soft, non distended. Bowel sounds appreciated. No obvious liver or spleen enlargement. Non tender, no rebound noted. Extremities: Non tender, no swelling noted, strength 5/5 both legs. CNS: Grossly intact. Labs:  CBC with Differential:    Lab Results   Component Value Date    WBC 7.7 12/17/2020    RBC 3.39 12/17/2020    HGB 11.3 12/17/2020    HCT 32.1 12/17/2020     12/17/2020    MCV 94.7 12/17/2020    MCH 33.3 12/17/2020    MCHC 35.2 12/17/2020    RDW 12.5 12/17/2020    SEGSPCT 63.0 12/17/2020    LYMPHOPCT 25.6 12/17/2020    MONOPCT 8.2 12/17/2020    BASOPCT 0.4 12/17/2020    MONOSABS 0.6 12/17/2020    LYMPHSABS 2.0 12/17/2020    EOSABS 0.2 12/17/2020    BASOSABS 0.0 12/17/2020    DIFFTYPE AUTOMATED DIFFERENTIAL 12/17/2020     CMP:    Lab Results   Component Value Date     12/17/2020    K 3.1 12/17/2020     12/17/2020    CO2 24 12/17/2020    BUN 30 12/17/2020    CREATININE 0.9 12/17/2020    GFRAA >60 12/17/2020    LABGLOM >60 12/17/2020    GLUCOSE 98 12/17/2020    PROT 4.8 12/17/2020    LABALBU 3.0 12/17/2020    CALCIUM 7.6 12/17/2020    BILITOT 0.4 12/17/2020    ALKPHOS 69 12/17/2020    AST 14 12/17/2020    ALT 8 12/17/2020     No results for input(s): TROPONINT in the last 72 hours.   No results found for: TSHHS      sodium chloride 75 mL/hr at 12/16/20 2152      sodium chloride flush  10 mL Intravenous 2 times per day    enoxaparin  40 mg Subcutaneous Daily    aspirin EC  81 mg Oral Daily    atorvastatin  20 mg Oral Daily    carvedilol  6.25 mg Oral BID WC    dilTIAZem  120 mg Oral Daily    donepezil  10 mg Oral Nightly    enalapril  5 mg Oral Daily    QUEtiapine  150 mg Oral Nightly    pantoprazole  40 mg Oral QAM AC    memantine  10 mg Oral BID    cefTRIAXone (ROCEPHIN) IV  1 g Intravenous Q24H    fleet  1 enema Rectal BID    metoclopramide  10 mg Intravenous Q8H    lubiprostone  24 mcg Oral BID WC    senna  2 tablet Oral Nightly    ondansetron  4 mg Intravenous TID    sodium chloride flush  10 mL Intravenous BID         Assessment:       Patient Active Problem List    Diagnosis Date Noted    Intractable vomiting with nausea 12/16/2020    CAD (coronary artery disease)     Chest pain 12/31/2013    Renal insufficiency 12/31/2013    GERD (gastroesophageal reflux disease) 12/31/2013    Hypercholesterolemia 12/31/2013    Anxiety 12/31/2013    Hypertension 12/31/2013    Osteoarthritis 12/31/2013       Plan:     Problems being addressed this admission:   Intractable vomiting / constipation  12/16/20-Bilious vomiting: No evidence of bowel obstruction. CT of the abdomen with large hiatal hernia, large amount of fecal material at the rectal vault. Advance diet to general, IV fluid. Refused surgical procedure or endoscopic procedure. Continue antiemetics. Constipation: Manual disimxpaction done at the ER. Start laxatives. 12/17/20- seen by GI and is to have EGD  This Saturday. Started on Reglan and Amitiza for her constipation. On clears. Hypokalemia  12/17/20- K today is 3.1 continue to monitor. Replace.        Chronic medical problems as of 12/16/20  Abdominal aortic aneurysm  Alzheimer's dementia: Continue medications. Hypertension: Blood pressure controlled. Continue medications.    Hyperlipidemia: continue statin  CAD: continue medications. Consultants:  GI    General Orders:  Repeat basic labs again in am.  I have explained to the patient and discussed with him/her the treatment plan.   Cuba Del Cid MD, Vasile Jonhsono

## 2020-12-18 ENCOUNTER — ANESTHESIA EVENT (OUTPATIENT)
Dept: ENDOSCOPY | Age: 80
End: 2020-12-18
Payer: MEDICARE

## 2020-12-18 LAB
HCT VFR BLD CALC: 31.1 % (ref 37–47)
HEMOGLOBIN: 10.9 GM/DL (ref 12.5–16)
SARS-COV-2, NAAT: NOT DETECTED
SOURCE: NORMAL

## 2020-12-18 PROCEDURE — 6370000000 HC RX 637 (ALT 250 FOR IP): Performed by: INTERNAL MEDICINE

## 2020-12-18 PROCEDURE — 96366 THER/PROPH/DIAG IV INF ADDON: CPT

## 2020-12-18 PROCEDURE — 2580000003 HC RX 258: Performed by: INTERNAL MEDICINE

## 2020-12-18 PROCEDURE — 6360000002 HC RX W HCPCS: Performed by: HOSPITALIST

## 2020-12-18 PROCEDURE — 6360000002 HC RX W HCPCS: Performed by: INTERNAL MEDICINE

## 2020-12-18 PROCEDURE — 96372 THER/PROPH/DIAG INJ SC/IM: CPT

## 2020-12-18 PROCEDURE — 85018 HEMOGLOBIN: CPT

## 2020-12-18 PROCEDURE — 6360000002 HC RX W HCPCS: Performed by: NURSE PRACTITIONER

## 2020-12-18 PROCEDURE — 6370000000 HC RX 637 (ALT 250 FOR IP): Performed by: NURSE PRACTITIONER

## 2020-12-18 PROCEDURE — 96376 TX/PRO/DX INJ SAME DRUG ADON: CPT

## 2020-12-18 PROCEDURE — 85014 HEMATOCRIT: CPT

## 2020-12-18 PROCEDURE — U0002 COVID-19 LAB TEST NON-CDC: HCPCS

## 2020-12-18 PROCEDURE — G0378 HOSPITAL OBSERVATION PER HR: HCPCS

## 2020-12-18 PROCEDURE — 2580000003 HC RX 258: Performed by: HOSPITALIST

## 2020-12-18 PROCEDURE — 94761 N-INVAS EAR/PLS OXIMETRY MLT: CPT

## 2020-12-18 PROCEDURE — 36415 COLL VENOUS BLD VENIPUNCTURE: CPT

## 2020-12-18 RX ORDER — LACTULOSE 10 G/15ML
30 SOLUTION ORAL 3 TIMES DAILY
Status: DISCONTINUED | OUTPATIENT
Start: 2020-12-18 | End: 2020-12-18

## 2020-12-18 RX ADMIN — QUETIAPINE FUMARATE 150 MG: 100 TABLET ORAL at 20:06

## 2020-12-18 RX ADMIN — ONDANSETRON 4 MG: 2 INJECTION INTRAMUSCULAR; INTRAVENOUS at 13:58

## 2020-12-18 RX ADMIN — ENOXAPARIN SODIUM 40 MG: 40 INJECTION SUBCUTANEOUS at 13:59

## 2020-12-18 RX ADMIN — SODIUM CHLORIDE, PRESERVATIVE FREE 10 ML: 5 INJECTION INTRAVENOUS at 06:49

## 2020-12-18 RX ADMIN — DILTIAZEM HYDROCHLORIDE 120 MG: 120 CAPSULE, COATED, EXTENDED RELEASE ORAL at 13:57

## 2020-12-18 RX ADMIN — METOCLOPRAMIDE 10 MG: 5 INJECTION, SOLUTION INTRAMUSCULAR; INTRAVENOUS at 15:32

## 2020-12-18 RX ADMIN — LUBIPROSTONE 24 MCG: 24 CAPSULE, GELATIN COATED ORAL at 17:39

## 2020-12-18 RX ADMIN — METOCLOPRAMIDE 10 MG: 5 INJECTION, SOLUTION INTRAMUSCULAR; INTRAVENOUS at 20:07

## 2020-12-18 RX ADMIN — CEFTRIAXONE 1 G: 1 INJECTION, POWDER, FOR SOLUTION INTRAMUSCULAR; INTRAVENOUS at 13:56

## 2020-12-18 RX ADMIN — SENNOSIDES 17.2 MG: 8.6 TABLET, FILM COATED ORAL at 20:06

## 2020-12-18 RX ADMIN — MEMANTINE 10 MG: 10 TABLET ORAL at 20:06

## 2020-12-18 RX ADMIN — ASPIRIN 81 MG: 81 TABLET, COATED ORAL at 13:57

## 2020-12-18 RX ADMIN — MEMANTINE 10 MG: 10 TABLET ORAL at 13:57

## 2020-12-18 RX ADMIN — SODIUM CHLORIDE: 9 INJECTION, SOLUTION INTRAVENOUS at 20:06

## 2020-12-18 RX ADMIN — CARVEDILOL 6.25 MG: 6.25 TABLET, FILM COATED ORAL at 17:39

## 2020-12-18 RX ADMIN — LUBIPROSTONE 24 MCG: 24 CAPSULE, GELATIN COATED ORAL at 13:57

## 2020-12-18 RX ADMIN — SODIUM CHLORIDE: 9 INJECTION, SOLUTION INTRAVENOUS at 01:34

## 2020-12-18 RX ADMIN — CARVEDILOL 6.25 MG: 6.25 TABLET, FILM COATED ORAL at 13:57

## 2020-12-18 RX ADMIN — DONEPEZIL HYDROCHLORIDE 10 MG: 10 TABLET, FILM COATED ORAL at 20:06

## 2020-12-18 RX ADMIN — METOCLOPRAMIDE 10 MG: 5 INJECTION, SOLUTION INTRAMUSCULAR; INTRAVENOUS at 06:49

## 2020-12-18 RX ADMIN — ENALAPRIL MALEATE 5 MG: 5 TABLET ORAL at 13:57

## 2020-12-18 RX ADMIN — PROMETHAZINE HYDROCHLORIDE 12.5 MG: 25 TABLET ORAL at 20:06

## 2020-12-18 RX ADMIN — PANTOPRAZOLE SODIUM 40 MG: 40 TABLET, DELAYED RELEASE ORAL at 06:49

## 2020-12-18 RX ADMIN — ATORVASTATIN CALCIUM 20 MG: 20 TABLET, FILM COATED ORAL at 20:07

## 2020-12-18 ASSESSMENT — PAIN SCALES - GENERAL
PAINLEVEL_OUTOF10: 0
PAINLEVEL_OUTOF10: 0

## 2020-12-18 NOTE — PROGRESS NOTES
Natividad Chase MD, [address]                Internal Medicine Hospitalist             Daily Progress  Note   Subjective:     Chief Complaint   Patient presents with    Constipation     constipation x 3 days    Emesis     Ms. Mock Complains of nil somewhat confused. Objective:    BP (!) 145/68   Pulse 60   Temp 98.2 °F (36.8 °C) (Oral)   Resp 16   Wt 123 lb 4.8 oz (55.9 kg)   SpO2 90%   BMI 24.08 kg/m²      Intake/Output Summary (Last 24 hours) at 12/18/2020 1457  Last data filed at 12/18/2020 1154  Gross per 24 hour   Intake 256 ml   Output --   Net 256 ml      Physical Exam:  Heart:  Distant heart sounds. Lungs: Mostly clear to auscultation, decreased breath sounds at bases. No wheezes appreciated no crackles heard. Poor effort. Abdomen: Soft, non distended. Bowel sounds appreciated. No obvious liver or spleen enlargement. Non tender, no rebound noted. Extremities: Non tender, no swelling noted, strength 5/5 both legs. CNS: Grossly intact. Labs:  CBC with Differential:    Lab Results   Component Value Date    WBC 7.7 12/17/2020    RBC 3.39 12/17/2020    HGB 10.9 12/18/2020    HCT 31.1 12/18/2020     12/17/2020    MCV 94.7 12/17/2020    MCH 33.3 12/17/2020    MCHC 35.2 12/17/2020    RDW 12.5 12/17/2020    SEGSPCT 63.0 12/17/2020    LYMPHOPCT 25.6 12/17/2020    MONOPCT 8.2 12/17/2020    BASOPCT 0.4 12/17/2020    MONOSABS 0.6 12/17/2020    LYMPHSABS 2.0 12/17/2020    EOSABS 0.2 12/17/2020    BASOSABS 0.0 12/17/2020    DIFFTYPE AUTOMATED DIFFERENTIAL 12/17/2020     BMP:    Lab Results   Component Value Date     12/17/2020    K 3.1 12/17/2020     12/17/2020    CO2 24 12/17/2020    BUN 30 12/17/2020    LABALBU 3.0 12/17/2020    CREATININE 0.9 12/17/2020    CALCIUM 7.6 12/17/2020    GFRAA >60 12/17/2020    LABGLOM >60 12/17/2020    GLUCOSE 98 12/17/2020     No results for input(s): TROPONINT in the last 72 hours.   No results found for: TSHHS      sodium chloride 75 mL/hr at 12/18/20 0134      lactulose  30 g Oral TID    sodium chloride flush  10 mL Intravenous 2 times per day    enoxaparin  40 mg Subcutaneous Daily    aspirin EC  81 mg Oral Daily    atorvastatin  20 mg Oral Daily    carvedilol  6.25 mg Oral BID WC    dilTIAZem  120 mg Oral Daily    donepezil  10 mg Oral Nightly    enalapril  5 mg Oral Daily    QUEtiapine  150 mg Oral Nightly    pantoprazole  40 mg Oral QAM AC    memantine  10 mg Oral BID    cefTRIAXone (ROCEPHIN) IV  1 g Intravenous Q24H    fleet  1 enema Rectal BID    metoclopramide  10 mg Intravenous Q8H    lubiprostone  24 mcg Oral BID WC    senna  2 tablet Oral Nightly    sodium chloride flush  10 mL Intravenous BID         Assessment:       Patient Active Problem List    Diagnosis Date Noted    Intractable vomiting with nausea 12/16/2020    CAD (coronary artery disease)     Chest pain 12/31/2013    Renal insufficiency 12/31/2013    GERD (gastroesophageal reflux disease) 12/31/2013    Hypercholesterolemia 12/31/2013    Anxiety 12/31/2013    Hypertension 12/31/2013    Osteoarthritis 12/31/2013       Plan:     Problems being addressed this admission:   Intractable vomiting / constipation  12/16/20-Bilious vomiting: No evidence of bowel obstruction. CT of the abdomen with large hiatal hernia, large amount of fecal material at the rectal vault. Advance diet to general, IV fluid. Refused surgical procedure or endoscopic procedure. Continue antiemetics. Constipation: Manual disimxpaction done at the ER.  Start laxatives. 12/17/20- seen by GI and is to have EGD  This Saturday. Started on Reglan and Amitiza for her constipation. On clears. 12/18/20- hb is 10.9 today. To have EGD in am per GI. Continue to monitor no more vomiting noted. She had a BM last night per RN.      Hypokalemia  12/17/20- K today is 3.1 continue to monitor. Replace.  \  12/18/20- no labs from today.         Chronic medical problems as of 12/16/20  Abdominal aortic

## 2020-12-18 NOTE — CARE COORDINATION
CM spoke with pt daughter, Noemy Thomas, for follow up on discharge plan. Noemy Thomas stated that the patients hospital bed was delivered yesterday. Pt is scheduled to have a EGD tomorrow. Noemy Thomas stated that the pt has not been eating much and asked if nursing could encourage her intake. Updated her that pt is limited to clear liquids today. Noemy Thomas stated that discharge plan remains home w/ [facility] at discharge. Noemy Thomas stated that [facility] informed her that pt is on the \"teal team\" and that would need to be mentioned when notifying them of discharge. Pt will need a consult to home health care at discharge please. Noemy Thomas also stated pt may need transport home at discharge. If pt should discharge after hours or on the weekend, please notify [facility] at [phone]. Please fax home health care order, H&P, discharge summary and completed AVS to the number they provide.

## 2020-12-18 NOTE — PROGRESS NOTES
[facility]        Progress Note       2020  9:00 AM    Patient:    Emery Prasad  : 1940   [de-identified] y.o. MRN: [id number]  Admitted: 12/15/2020  7:57 PM ATT: Barbara Currie MD   1119/1119-A  AdmitDx: Feculent vomit on examination [R11.13]  Intractable vomiting with nausea [R11.2]  Non-intractable vomiting with nausea, unspecified vomiting type [R11.2]  PCP: Mariana Woodard MD    SUBJECTIVE:  Chart reviewed, events noted  Patient feeling well. No complaints. 3-4 BM yesterday. Yesterday per nursing staff. Tolerating diet, needs lots of encouragement and feeding to eat. No N/V. No abdominal pain. ROS: Unable to obtain, pt has advanced Alzheimer's     OBJECTIVE:      BP (!) 169/78   Pulse 63   Temp 98.5 °F (36.9 °C) (Oral)   Resp 16   Wt 123 lb 4.8 oz (55.9 kg)   SpO2 94%   BMI 24.08 kg/m²     NAD, appears comfortable, lying in bed  Lips and mucous membranes pink and moist  RRR, Nl s1s2, no murmurs, no JVD  Lungs CTA bilaterally, respirations even and unlabored   Abdomen soft, ND, NT, no HSM, bowel sounds normal  Skin pink, warm, dry and CR brisk   No edema bilateral lower extremities   AAOx1, self      CBC:   Recent Labs     12/15/20  2212 12/17/20  0703 20  0700   WBC 14.3* 7.7  --    HGB 14.4 11.3* 10.9*    131*  --      BMP:    Recent Labs     12/15/20  2212 12/17/20  1343    143   K 3.8 3.1*   CL 98* 107   CO2 28 24   BUN 25* 30*   CREATININE 0.8 0.9   GLUCOSE 180* 98     Magnesium:   Lab Results   Component Value Date    MG 1.9 2020     Hepatic:   Recent Labs     12/15/20  2212 12/17/20  1343   AST 17 14*   ALT 13 8*   BILITOT 0.9 0.4   ALKPHOS 104 69     INR: No results for input(s): INR in the last 72 hours.       Intake/Output Summary (Last 24 hours) at 2020 0900  Last data filed at 2020 2468  Gross per 24 hour   Intake 136 ml   Output --   Net 136 ml       Medications    Scheduled Medications:    lactulose  30 g Oral TID  sodium chloride flush  10 mL Intravenous 2 times per day    enoxaparin  40 mg Subcutaneous Daily    aspirin EC  81 mg Oral Daily    atorvastatin  20 mg Oral Daily    carvedilol  6.25 mg Oral BID WC    dilTIAZem  120 mg Oral Daily    donepezil  10 mg Oral Nightly    enalapril  5 mg Oral Daily    QUEtiapine  150 mg Oral Nightly    pantoprazole  40 mg Oral QAM AC    memantine  10 mg Oral BID    cefTRIAXone (ROCEPHIN) IV  1 g Intravenous Q24H    fleet  1 enema Rectal BID    metoclopramide  10 mg Intravenous Q8H    lubiprostone  24 mcg Oral BID WC    senna  2 tablet Oral Nightly    ondansetron  4 mg Intravenous TID    sodium chloride flush  10 mL Intravenous BID     PRN Medications: potassium chloride, sodium chloride flush, promethazine **OR** ondansetron, polyethylene glycol, acetaminophen **OR** acetaminophen  Infusions:    sodium chloride 75 mL/hr at 12/18/20 0134         Patient Active Problem List   Diagnosis Code    Chest pain R07.9    Renal insufficiency N28.9    GERD (gastroesophageal reflux disease) K21.9    Hypercholesterolemia E78.00    Anxiety F41.9    Hypertension I10    Osteoarthritis M19.90    CAD (coronary artery disease) I25.10    Intractable vomiting with nausea R11.2        ASSESSMENT AND RECOMMENDATIONS:    Nausea, vomiting:  Improved   CT abd pelvis revealed large hiatal hernia, apparent thickening at [facility]  May be s/t large HH, uncontrolled GERD, gastroparesis  May be exacerbated by Constipation  Continue Reglan 10 mg TID x 5 days total  Zofran 4 mg as needed   Continue Protonix 40 mg IV BID   Recommend clear liquid diet, adv to mechanical soft as tolerated      Constipation:  Clinically improved  CT revealed large fecal matter rectal vault   May be secondary decreased mobility, aggravated by dementia   Continue bowel regimen Fleet enema BID x 3 days total   Senna 2 tabs at HS    Amitiza 24  mcg BID   Reglan 10 mg TID as above  Continue daily probiotic    Melena: May be s/t gastritis, PUD,AVM  Hgb stable at 10.9, Continue daily H&H  PPI as above   Plan EGD tmrw 12/19/2020 for further investigation, verbal    NPO after MN  Rapid Covid 19 ordered     Discussed plan of care with patient and daughterTerri    Patient clinical, biochemical, and radiological information discussed with Dr. Curt Ghosh. He agrees with the assessment and plan. Melia Swain, CNP  12/18/2020  9:00 AM     Improved  Hb stable    EGD am    I have seen and examined this patient personally, and independently of the nurse practitioner. The plan was developed mutually at the time of the visit with the patient. Emi Johnson and myself have spoken with patient, nursing staff and provided written and verbal instructions .     The above note has been reviewed and I agree with the Assessment,  Diagnosis, and Treatment plan as suggested by [facility]

## 2020-12-18 NOTE — ANESTHESIA PRE PROCEDURE
Department of Anesthesiology  Preprocedure Note       Name:  Pat Red   Age:  [de-identified] y.o.  :  1940                                          MRN:  [id number]         Date:  2020      Surgeon: Michele Chu):  Tiffany Allen MD    Procedure: Procedure(s):  EGD ESOPHAGOGASTRODUODENOSCOPY    Medications prior to admission:   Prior to Admission medications    Medication Sig Start Date End Date Taking? Authorizing Provider   LORazepam (ATIVAN) 1 MG tablet Take 1 mg by mouth every 8 hours as needed for Anxiety. Historical Provider, MD   QUEtiapine (SEROQUEL) 300 MG tablet Take 150 mg by mouth nightly    Historical Provider, MD   nitroGLYCERIN (NITROSTAT) 0.4 MG SL tablet Place 1 tablet under the tongue every 5 minutes as needed for Chest pain 17   Katie Cervantes MD   enalapril (VASOTEC) 5 MG tablet Take 5 mg by mouth daily    Historical Provider, MD   memantine (NAMENDA XR) 28 MG CP24 capsule Take 28 mg by mouth daily    Historical Provider, MD   carvedilol (COREG) 6.25 MG tablet Take 6.25 mg by mouth 2 times daily (with meals). Historical Provider, MD   atorvastatin (LIPITOR) 20 MG tablet Take 20 mg by mouth daily. Historical Provider, MD   omeprazole (PRILOSEC) 40 MG capsule Take 1 capsule by mouth daily. Patient taking differently: Take 40 mg by mouth 2 times daily  10/17/14   Vivian Gavin MD   donepezil (ARICEPT) 10 MG tablet Take 10 mg by mouth nightly. 10/2/14   Historical Provider, MD   diltiazem (CARDIZEM CD) 120 MG ER capsule Take 120 mg by mouth daily  10/2/14   Historical Provider, MD   aspirin EC 81 MG EC tablet Take 1 tablet by mouth daily.  14   Katie Cervantes MD       Current medications:    Current Facility-Administered Medications   Medication Dose Route Frequency Provider Last Rate Last Admin    lactulose (CHRONULAC) 10 GM/15ML solution 30 g  30 g Oral TID Tomas Guerra, APRN - CNP  potassium chloride 10 mEq/100 mL IVPB (Peripheral Line)  10 mEq Intravenous PRN Rosalio Duong MD        sodium chloride flush 0.9 % injection 10 mL  10 mL Intravenous 2 times per day Allegra Gates MD   10 mL at 12/17/20 2157    sodium chloride flush 0.9 % injection 10 mL  10 mL Intravenous PRN Allegra Gates MD   10 mL at 12/18/20 0649    enoxaparin (LOVENOX) injection 40 mg  40 mg Subcutaneous Daily Allegra Gates MD   40 mg at 12/17/20 0755    promethazine (PHENERGAN) tablet 12.5 mg  12.5 mg Oral Q6H PRN Allegra Gates MD        Or    ondansetron [facility]) injection 4 mg  4 mg Intravenous Q6H PRN Allegra Gates MD        polyethylene glycol [facility]) packet 17 g  17 g Oral Daily PRN Allegra Gates MD        acetaminophen (TYLENOL) tablet 650 mg  650 mg Oral Q6H PRN Allegra Gates MD        Or    acetaminophen (TYLENOL) suppository 650 mg  650 mg Rectal Q6H PRN Allegra Gates MD        0.9 % sodium chloride infusion   Intravenous Continuous Allegra Gates MD 75 mL/hr at 12/18/20 0134 New Bag at 12/18/20 0134    aspirin EC tablet 81 mg  81 mg Oral Daily Allegra Gates MD   81 mg at 12/17/20 0755    atorvastatin (LIPITOR) tablet 20 mg  20 mg Oral Daily Allegra Gates MD   20 mg at 12/17/20 0756    carvedilol (COREG) tablet 6.25 mg  6.25 mg Oral BID WC Allegra Gates MD   6.25 mg at 12/17/20 1707    dilTIAZem (CARDIZEM CD) extended release capsule 120 mg  120 mg Oral Daily Allegra Gates MD   120 mg at 12/17/20 0756    donepezil (ARICEPT) tablet 10 mg  10 mg Oral Nightly Allegra Gates MD   10 mg at 12/17/20 2157    enalapril (VASOTEC) tablet 5 mg  5 mg Oral Daily Allegra Gates MD   5 mg at 12/17/20 0800    QUEtiapine (SEROQUEL) tablet 150 mg  150 mg Oral Nightly Allegra Gates MD   150 mg at 12/17/20 5138    pantoprazole (PROTONIX) tablet 40 mg  40 mg Oral QAM  Allegra Gates MD   40 mg at 12/18/20 7314  memantine (NAMENDA) tablet 10 mg  10 mg Oral BID Carolina Mcconnell MD   10 mg at 12/17/20 2157    cefTRIAXone (ROCEPHIN) 1 g IVPB in 50 mL D5W minibag  1 g Intravenous Q24H Keri Alexandra MD   Stopped at 12/17/20 [facility] rectal enema 1 enema  1 enema Rectal BID Puja Humphreys APRN - CNP   1 enema at 12/16/20 1743    metoclopramide (REGLAN) injection 10 mg  10 mg Intravenous Q8H Puja Humphreys APRN - CNP   10 mg at 12/18/20 3603    lubiprostone (AMITIZA) capsule 24 mcg  24 mcg Oral BID WC Puja Humphreys APRN - CNP   24 mcg at 12/17/20 1707    senna (SENOKOT) tablet 17.2 mg  2 tablet Oral Nightly Puja Humphreys APRN - CNP        ondansetron (ZOFRAN) injection 4 mg  4 mg Intravenous TID Puja Humphreys APRN - CNP   4 mg at 12/17/20 2157    sodium chloride flush 0.9 % injection 10 mL  10 mL Intravenous BID Carolina Mcconnell MD   10 mL at 12/16/20 0857       Allergies: Allergies   Allergen Reactions    Bactrim [Sulfamethoxazole-Trimethoprim] Hives    Pcn [Penicillins]        Problem List:    Patient Active Problem List   Diagnosis Code    Chest pain R07.9    Renal insufficiency N28.9    GERD (gastroesophageal reflux disease) K21.9    Hypercholesterolemia E78.00    Anxiety F41.9    Hypertension I10    Osteoarthritis M19.90    CAD (coronary artery disease) I25.10    Intractable vomiting with nausea R11.2       Past Medical History:        Diagnosis Date    Arthritis     Asthma     CAD (coronary artery disease)     Chest pain 1/2/14    Went through the ER.  Dementia ([address]     GERD (gastroesophageal reflux disease)     H/O cardiovascular stress test 12/31/13    EF 70% 1. Abnormal Lexiscan Cardiolite study revealing left anterior descending territory ischemia, but the possibility of abnormality secondary to breast artifact alone can not be excluded, hence clinical correlation is recommended. 2.Normal left ventricular function by gated scan.     H/O cardiovascular stress test 10/15/14 EF70% Normal Prefusion Study     History of exercise stress test 1/14    History of nuclear stress test 02/08/2017    lexiscan-normal,EF70%    Hx of cardiovascular stress test 10/1/2015    lexiscan-normal,EF70%    Hx of echocardiogram 10/1/2015    EF 55%. Normal chamber sizes. Normal LVSF, but abnormal diastolic function. Mild MR and TR. MIminal pulmonary HTN. Mild, but hemodynamically insignificant aortic stenosis. A small abd aortic aneurysm measuring 2.9cm.  Hypertension     Movement disorder     osteoporosis    Psychiatric problem     S/P PTCA (percutaneous transluminal coronary angioplasty) 1/2/14    1. Three-vessel coronary artery disease,LAD Disease is mild to moderate. 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 The right coronary artery has critical disease as described. 2. Normal resting hemodynamics. 3. Normal left ventricular function and wall motion, no mitral regurgitation. 4. Successful percutaneous intervention with excellent results 3.0 x 26 Resolute stent. Past Surgical History:        Procedure Laterality Date    CARPAL TUNNEL RELEASE Bilateral     CHOLECYSTECTOMY      COLONOSCOPY  12/14/2015    sigmoid diverticulosis, non bleeding internal hemorrhoids    CORONARY ANGIOPLASTY WITH STENT PLACEMENT  01/02/2014    angioplasty and stenting of RCA, three vessed CAD, LAD has mild to mod, CX has mild disease.     EYE SURGERY      cataract       Social History:    Social History     Tobacco Use    Smoking status: Former Smoker    Smokeless tobacco: Never Used   Substance Use Topics    Alcohol use: No     Alcohol/week: 0.0 standard drinks                                Counseling given: Not Answered      Vital Signs (Current):   Vitals:    12/17/20 2020 12/17/20 2031 12/18/20 0500 12/18/20 0830   BP: (!) 195/92 (!) 162/64 (!) 178/79 (!) 169/78   Pulse: 59 55 63 63   Resp: 18  18 16   Temp: 36.6 °C (97.9 °F)  36.9 °C (98.5 °F) 36.9 °C (98.5 °F)   TempSrc: Oral   Oral

## 2020-12-19 ENCOUNTER — ANESTHESIA (OUTPATIENT)
Dept: ENDOSCOPY | Age: 80
End: 2020-12-19
Payer: MEDICARE

## 2020-12-19 VITALS — DIASTOLIC BLOOD PRESSURE: 76 MMHG | OXYGEN SATURATION: 99 % | SYSTOLIC BLOOD PRESSURE: 142 MMHG

## 2020-12-19 LAB
HCT VFR BLD CALC: 31.1 % (ref 37–47)
HEMOGLOBIN: 11.1 GM/DL (ref 12.5–16)

## 2020-12-19 PROCEDURE — 3609012400 HC EGD TRANSORAL BIOPSY SINGLE/MULTIPLE: Performed by: INTERNAL MEDICINE

## 2020-12-19 PROCEDURE — 88112 CYTOPATH CELL ENHANCE TECH: CPT | Performed by: PATHOLOGY

## 2020-12-19 PROCEDURE — 85014 HEMATOCRIT: CPT

## 2020-12-19 PROCEDURE — 94761 N-INVAS EAR/PLS OXIMETRY MLT: CPT

## 2020-12-19 PROCEDURE — G0378 HOSPITAL OBSERVATION PER HR: HCPCS

## 2020-12-19 PROCEDURE — 6370000000 HC RX 637 (ALT 250 FOR IP): Performed by: INTERNAL MEDICINE

## 2020-12-19 PROCEDURE — 6360000002 HC RX W HCPCS: Performed by: INTERNAL MEDICINE

## 2020-12-19 PROCEDURE — 6360000002 HC RX W HCPCS: Performed by: NURSE PRACTITIONER

## 2020-12-19 PROCEDURE — 88305 TISSUE EXAM BY PATHOLOGIST: CPT | Performed by: PATHOLOGY

## 2020-12-19 PROCEDURE — 2580000003 HC RX 258: Performed by: INTERNAL MEDICINE

## 2020-12-19 PROCEDURE — 36415 COLL VENOUS BLD VENIPUNCTURE: CPT

## 2020-12-19 PROCEDURE — 2709999900 HC NON-CHARGEABLE SUPPLY: Performed by: INTERNAL MEDICINE

## 2020-12-19 PROCEDURE — 3700000000 HC ANESTHESIA ATTENDED CARE: Performed by: INTERNAL MEDICINE

## 2020-12-19 PROCEDURE — 85018 HEMOGLOBIN: CPT

## 2020-12-19 PROCEDURE — 6360000002 HC RX W HCPCS: Performed by: NURSE ANESTHETIST, CERTIFIED REGISTERED

## 2020-12-19 PROCEDURE — C9113 INJ PANTOPRAZOLE SODIUM, VIA: HCPCS | Performed by: INTERNAL MEDICINE

## 2020-12-19 PROCEDURE — 3700000001 HC ADD 15 MINUTES (ANESTHESIA): Performed by: INTERNAL MEDICINE

## 2020-12-19 PROCEDURE — 96376 TX/PRO/DX INJ SAME DRUG ADON: CPT

## 2020-12-19 PROCEDURE — 2500000003 HC RX 250 WO HCPCS: Performed by: NURSE ANESTHETIST, CERTIFIED REGISTERED

## 2020-12-19 RX ORDER — PROPOFOL 10 MG/ML
INJECTION, EMULSION INTRAVENOUS PRN
Status: DISCONTINUED | OUTPATIENT
Start: 2020-12-19 | End: 2020-12-19 | Stop reason: SDUPTHER

## 2020-12-19 RX ORDER — PANTOPRAZOLE SODIUM 40 MG/10ML
40 INJECTION, POWDER, LYOPHILIZED, FOR SOLUTION INTRAVENOUS 2 TIMES DAILY
Status: DISCONTINUED | OUTPATIENT
Start: 2020-12-19 | End: 2020-12-21 | Stop reason: HOSPADM

## 2020-12-19 RX ORDER — LIDOCAINE HYDROCHLORIDE 20 MG/ML
INJECTION, SOLUTION INFILTRATION; PERINEURAL PRN
Status: DISCONTINUED | OUTPATIENT
Start: 2020-12-19 | End: 2020-12-19 | Stop reason: SDUPTHER

## 2020-12-19 RX ADMIN — LIDOCAINE HYDROCHLORIDE 60 MG: 20 INJECTION, SOLUTION INFILTRATION; PERINEURAL at 12:09

## 2020-12-19 RX ADMIN — FLUCONAZOLE 100 MG: 2 INJECTION, SOLUTION INTRAVENOUS at 18:23

## 2020-12-19 RX ADMIN — METOCLOPRAMIDE 10 MG: 5 INJECTION, SOLUTION INTRAMUSCULAR; INTRAVENOUS at 17:48

## 2020-12-19 RX ADMIN — METOCLOPRAMIDE 10 MG: 5 INJECTION, SOLUTION INTRAMUSCULAR; INTRAVENOUS at 06:01

## 2020-12-19 RX ADMIN — CARVEDILOL 6.25 MG: 6.25 TABLET, FILM COATED ORAL at 17:48

## 2020-12-19 RX ADMIN — SENNOSIDES 17.2 MG: 8.6 TABLET, FILM COATED ORAL at 21:35

## 2020-12-19 RX ADMIN — SODIUM CHLORIDE: 9 INJECTION, SOLUTION INTRAVENOUS at 17:47

## 2020-12-19 RX ADMIN — PROPOFOL 10 MG: 10 INJECTION, EMULSION INTRAVENOUS at 12:12

## 2020-12-19 RX ADMIN — MEMANTINE 10 MG: 10 TABLET ORAL at 21:35

## 2020-12-19 RX ADMIN — QUETIAPINE FUMARATE 150 MG: 100 TABLET ORAL at 21:35

## 2020-12-19 RX ADMIN — LUBIPROSTONE 24 MCG: 24 CAPSULE, GELATIN COATED ORAL at 17:52

## 2020-12-19 RX ADMIN — ATORVASTATIN CALCIUM 20 MG: 20 TABLET, FILM COATED ORAL at 21:35

## 2020-12-19 RX ADMIN — DONEPEZIL HYDROCHLORIDE 10 MG: 10 TABLET, FILM COATED ORAL at 21:35

## 2020-12-19 RX ADMIN — PROPOFOL 30 MG: 10 INJECTION, EMULSION INTRAVENOUS at 12:09

## 2020-12-19 RX ADMIN — CEFTRIAXONE 1 G: 1 INJECTION, POWDER, FOR SOLUTION INTRAMUSCULAR; INTRAVENOUS at 16:43

## 2020-12-19 RX ADMIN — ENOXAPARIN SODIUM 40 MG: 40 INJECTION SUBCUTANEOUS at 17:48

## 2020-12-19 RX ADMIN — PANTOPRAZOLE SODIUM 40 MG: 40 INJECTION, POWDER, LYOPHILIZED, FOR SOLUTION INTRAVENOUS at 21:35

## 2020-12-19 NOTE — OP NOTE
Operative Note      Patient: Itzel Mccord  YOB: 1940  MRN: [id number]    Date of Procedure: 12/19/2020    Pre-Op Diagnosis: NAUSEA, VOMITING    [facility]      BRIEF OP REPORT:    Impression:    1) SEVERE EROSIVE ESOPHAGITIS LOWER Half of esophagus, Moderate size Hiatal Hernia , Brushings done to r/o candidia   2) Rest of study normal           Suggest:   1) PPI BID   2) Diflucan for 10 days   3) advance diet as tolerated     Full EGD/COLONOSCOPY report available by going to \"chart review\" then \"procedures\" then  \"EGD/Colonoscopy\"  then \"View Endoscopy Report\" Detailed Description of Procedure:       Electronically signed by Long Fuentes MD on 12/19/2020 at 12:17 PM

## 2020-12-19 NOTE — PROGRESS NOTES
[address] [place] [address]        I have examined the patient within 24 hours  before the procedure and there is no change in the previous history and physical exam,which has been reviewed. There is no history of sleep apnea, snoring, or stridor. There has been no  previous adverse experience with sedation/anesthesia. There is no increased risk for aspiration of gastric contents. The patient has been instructed that all resuscitative measures (during the operative and immediate perioperative period) will be instituted in the unlikely event that they will be needed. ASA Class: 3  AIRWAY Class: 3     Consent form signed, witnessed and in soft chart           The patient was counseled at length about the risks of praveen Covid -!9 in the daisha-operative and post -operative states including the recovery window of their procedure. The patient was made aware that praveen Covid -19 after an endoscopic procedure may worsen their prognosis for recovering from the virus and lend to a higher morbidity and mortality risk. The patient was given the options of postponing their procedure. I have reviewed with the patient and/or family the risks, benefits, and alternatives to the procedure. The patient wishes to proceed with the procedure.     MD Indigo Page[facility]  12/19/2020  9:10 AM

## 2020-12-19 NOTE — PROGRESS NOTES
Ranjit Bell MD, [address]                Internal Medicine Hospitalist             Daily Progress  Note   Subjective:     Chief Complaint   Patient presents with    Constipation     constipation x 3 days    Emesis     Ms. Mock Complains of nil, eating dinner  Confused some. Objective:    BP (!) 144/74   Pulse 70   Temp 97.9 °F (36.6 °C) (Oral)   Resp 15   Wt 126 lb 12.8 oz (57.5 kg)   SpO2 96%   BMI 24.76 kg/m²      Intake/Output Summary (Last 24 hours) at 12/19/2020 1726  Last data filed at 12/19/2020 1215  Gross per 24 hour   Intake 170 ml   Output --   Net 170 ml      Physical Exam:  Heart:  Distant heart sounds. Lungs: Mostly clear to auscultation, decreased breath sounds at bases. No wheezes appreciated no crackles heard. Abdomen: Soft, non distended. Bowel sounds appreciated. No obvious liver or spleen enlargement. Non tender, no rebound noted. Extremities: Non tender, no swelling noted, strength 5/5 both legs. CNS: Grossly intact. Labs:  CBC with Differential:    Lab Results   Component Value Date    WBC 7.7 12/17/2020    RBC 3.39 12/17/2020    HGB 11.1 12/19/2020    HCT 31.1 12/19/2020     12/17/2020    MCV 94.7 12/17/2020    MCH 33.3 12/17/2020    MCHC 35.2 12/17/2020    RDW 12.5 12/17/2020    SEGSPCT 63.0 12/17/2020    LYMPHOPCT 25.6 12/17/2020    MONOPCT 8.2 12/17/2020    BASOPCT 0.4 12/17/2020    MONOSABS 0.6 12/17/2020    LYMPHSABS 2.0 12/17/2020    EOSABS 0.2 12/17/2020    BASOSABS 0.0 12/17/2020    DIFFTYPE AUTOMATED DIFFERENTIAL 12/17/2020     BMP:    Lab Results   Component Value Date     12/17/2020    K 3.1 12/17/2020     12/17/2020    CO2 24 12/17/2020    BUN 30 12/17/2020    LABALBU 3.0 12/17/2020    CREATININE 0.9 12/17/2020    CALCIUM 7.6 12/17/2020    GFRAA >60 12/17/2020    LABGLOM >60 12/17/2020    GLUCOSE 98 12/17/2020     No results for input(s): TROPONINT in the last 72 hours.   No results found for: [facility]      sodium chloride 75 mL/hr at 12/18/20 2006      pantoprazole  40 mg Intravenous BID    fluconazole  100 mg Intravenous Q24H    sodium chloride flush  10 mL Intravenous 2 times per day    enoxaparin  40 mg Subcutaneous Daily    aspirin EC  81 mg Oral Daily    atorvastatin  20 mg Oral Daily    carvedilol  6.25 mg Oral BID WC    dilTIAZem  120 mg Oral Daily    donepezil  10 mg Oral Nightly    enalapril  5 mg Oral Daily    QUEtiapine  150 mg Oral Nightly    memantine  10 mg Oral BID    cefTRIAXone (ROCEPHIN) IV  1 g Intravenous Q24H    metoclopramide  10 mg Intravenous Q8H    lubiprostone  24 mcg Oral BID WC    senna  2 tablet Oral Nightly    sodium chloride flush  10 mL Intravenous BID         Assessment:       Patient Active Problem List    Diagnosis Date Noted    Intractable vomiting with nausea 12/16/2020    CAD (coronary artery disease)     Chest pain 12/31/2013    Renal insufficiency 12/31/2013    GERD (gastroesophageal reflux disease) 12/31/2013    Hypercholesterolemia 12/31/2013    Anxiety 12/31/2013    Hypertension 12/31/2013    Osteoarthritis 12/31/2013       Plan:     Problems being addressed this admission:   Intractable vomiting / constipation  12/16/20-Bilious vomiting: No evidence of bowel obstruction. CT of the abdomen with large hiatal hernia, large amount of fecal material at the rectal vault. Advance diet to general, IV fluid. Refused surgical procedure or endoscopic procedure. Continue antiemetics. Constipation: Manual disimxpaction done at the ER.  Start laxatives. 12/17/20- seen by GI and is to have EGD  This Saturday. Started on Reglan and Amitiza for her constipation. On clears. 12/18/20- hb is 10.9 today. To have EGD in am per GI. Continue to monitor no more vomiting noted. She had a BM last night per RN.   12/19/20- egd done today has severe erosive esophagitis r/o candida PPI bid, diflucan x 10 days as per GI. Possible discharge in am may need PT/OT/CM May decide to go home with [facility].    Hypokalemia  12/17/20- K today is 3.1 continue to monitor. Replace.   12/18/20- no labs from today. 12/19/20- repeat labs in am.         Chronic medical problems as of 12/16/20  Abdominal aortic aneurysm  Alzheimer's dementia: Continue medications. Hypertension: Blood pressure controlled. Continue medications.   Hyperlipidemia: continue statin  CAD: continue medications.      Consultants:  GI    General Orders:  Repeat basic labs again in am.  I have explained to the patient possible discharge in am if ok with GI and labs are ok.    Governor MD [place], Britney Mulligan

## 2020-12-19 NOTE — ANESTHESIA POSTPROCEDURE EVALUATION
Department of Anesthesiology  Postprocedure Note    Patient: Pushpa Duarte  MRN: [id number]  YOB: 1940  Date of evaluation: 12/19/2020  Time:  12:21 PM     Procedure Summary     Date: 12/19/20 Room / Location: [address] [place] [address]    Anesthesia Start: 2521 Anesthesia Stop: 6416    Procedure: EGD ESOPHAGOGASTRODUODENOSCOPY (N/A ) Diagnosis: (NAUSEA, VOMITING)    Surgeons: Rustam Colunga MD Responsible Provider: Samia Bazan DO    Anesthesia Type: MAC ASA Status: 3          Anesthesia Type: MAC    Abdon Phase I:      Abdon Phase II:      Last vitals: Reviewed and per EMR flowsheets.        Anesthesia Post Evaluation    Patient location during evaluation: bedside  Patient participation: complete - patient participated  Level of consciousness: awake and sleepy but conscious  Pain score: 0  Airway patency: patent  Nausea & Vomiting: no nausea and no vomiting  Complications: no  Cardiovascular status: blood pressure returned to baseline and hemodynamically stable  Respiratory status: acceptable, room air and spontaneous ventilation  Hydration status: euvolemic

## 2020-12-20 LAB
ANION GAP SERPL CALCULATED.3IONS-SCNC: 12 MMOL/L (ref 4–16)
BUN BLDV-MCNC: 10 MG/DL (ref 6–23)
CALCIUM SERPL-MCNC: 7.9 MG/DL (ref 8.3–10.6)
CHLORIDE BLD-SCNC: 102 MMOL/L (ref 99–110)
CO2: 23 MMOL/L (ref 21–32)
CREAT SERPL-MCNC: 0.8 MG/DL (ref 0.6–1.1)
GFR AFRICAN AMERICAN: >60 ML/MIN/1.73M2
GFR NON-AFRICAN AMERICAN: >60 ML/MIN/1.73M2
GLUCOSE BLD-MCNC: 89 MG/DL (ref 70–99)
HCT VFR BLD CALC: 32.2 % (ref 37–47)
HEMOGLOBIN: 11.7 GM/DL (ref 12.5–16)
MCH RBC QN AUTO: 32.9 PG (ref 27–31)
MCHC RBC AUTO-ENTMCNC: 36.3 % (ref 32–36)
MCV RBC AUTO: 90.4 FL (ref 78–100)
PDW BLD-RTO: 11.6 % (ref 11.7–14.9)
PLATELET # BLD: 150 K/CU MM (ref 140–440)
PMV BLD AUTO: 9.3 FL (ref 7.5–11.1)
POTASSIUM SERPL-SCNC: 2.9 MMOL/L (ref 3.5–5.1)
POTASSIUM SERPL-SCNC: 3.3 MMOL/L (ref 3.5–5.1)
RBC # BLD: 3.56 M/CU MM (ref 4.2–5.4)
SODIUM BLD-SCNC: 137 MMOL/L (ref 135–145)
WBC # BLD: 5.2 K/CU MM (ref 4–10.5)

## 2020-12-20 PROCEDURE — 6360000002 HC RX W HCPCS: Performed by: INTERNAL MEDICINE

## 2020-12-20 PROCEDURE — 80048 BASIC METABOLIC PNL TOTAL CA: CPT

## 2020-12-20 PROCEDURE — C9113 INJ PANTOPRAZOLE SODIUM, VIA: HCPCS | Performed by: INTERNAL MEDICINE

## 2020-12-20 PROCEDURE — 6360000002 HC RX W HCPCS: Performed by: NURSE PRACTITIONER

## 2020-12-20 PROCEDURE — 96366 THER/PROPH/DIAG IV INF ADDON: CPT

## 2020-12-20 PROCEDURE — 36415 COLL VENOUS BLD VENIPUNCTURE: CPT

## 2020-12-20 PROCEDURE — 96376 TX/PRO/DX INJ SAME DRUG ADON: CPT

## 2020-12-20 PROCEDURE — G0378 HOSPITAL OBSERVATION PER HR: HCPCS

## 2020-12-20 PROCEDURE — 85018 HEMOGLOBIN: CPT

## 2020-12-20 PROCEDURE — 2580000003 HC RX 258: Performed by: INTERNAL MEDICINE

## 2020-12-20 PROCEDURE — 96367 TX/PROPH/DG ADDL SEQ IV INF: CPT

## 2020-12-20 PROCEDURE — 96375 TX/PRO/DX INJ NEW DRUG ADDON: CPT

## 2020-12-20 PROCEDURE — 84132 ASSAY OF SERUM POTASSIUM: CPT

## 2020-12-20 PROCEDURE — 2580000003 HC RX 258: Performed by: NURSE PRACTITIONER

## 2020-12-20 PROCEDURE — 96372 THER/PROPH/DIAG INJ SC/IM: CPT

## 2020-12-20 PROCEDURE — 6370000000 HC RX 637 (ALT 250 FOR IP): Performed by: INTERNAL MEDICINE

## 2020-12-20 PROCEDURE — 85027 COMPLETE CBC AUTOMATED: CPT

## 2020-12-20 PROCEDURE — 94761 N-INVAS EAR/PLS OXIMETRY MLT: CPT

## 2020-12-20 RX ORDER — POTASSIUM CHLORIDE 7.45 MG/ML
10 INJECTION INTRAVENOUS PRN
Status: DISCONTINUED | OUTPATIENT
Start: 2020-12-20 | End: 2020-12-21 | Stop reason: HOSPADM

## 2020-12-20 RX ADMIN — CEFTRIAXONE 1 G: 1 INJECTION, POWDER, FOR SOLUTION INTRAMUSCULAR; INTRAVENOUS at 14:31

## 2020-12-20 RX ADMIN — MEMANTINE 10 MG: 10 TABLET ORAL at 20:49

## 2020-12-20 RX ADMIN — SODIUM CHLORIDE: 9 INJECTION, SOLUTION INTRAVENOUS at 07:41

## 2020-12-20 RX ADMIN — ASPIRIN 81 MG: 81 TABLET, COATED ORAL at 12:11

## 2020-12-20 RX ADMIN — SODIUM CHLORIDE, PRESERVATIVE FREE 10 ML: 5 INJECTION INTRAVENOUS at 14:32

## 2020-12-20 RX ADMIN — DONEPEZIL HYDROCHLORIDE 10 MG: 10 TABLET, FILM COATED ORAL at 20:49

## 2020-12-20 RX ADMIN — CARVEDILOL 6.25 MG: 6.25 TABLET, FILM COATED ORAL at 12:11

## 2020-12-20 RX ADMIN — FLUCONAZOLE 100 MG: 2 INJECTION, SOLUTION INTRAVENOUS at 20:31

## 2020-12-20 RX ADMIN — POTASSIUM CHLORIDE 10 MEQ: 7.46 INJECTION, SOLUTION INTRAVENOUS at 11:26

## 2020-12-20 RX ADMIN — POTASSIUM CHLORIDE 10 MEQ: 7.46 INJECTION, SOLUTION INTRAVENOUS at 06:36

## 2020-12-20 RX ADMIN — METOCLOPRAMIDE 10 MG: 5 INJECTION, SOLUTION INTRAMUSCULAR; INTRAVENOUS at 18:15

## 2020-12-20 RX ADMIN — METOCLOPRAMIDE 10 MG: 5 INJECTION, SOLUTION INTRAMUSCULAR; INTRAVENOUS at 14:32

## 2020-12-20 RX ADMIN — HYDRALAZINE HYDROCHLORIDE 10 MG: 20 INJECTION INTRAMUSCULAR; INTRAVENOUS at 21:30

## 2020-12-20 RX ADMIN — POTASSIUM CHLORIDE 10 MEQ: 7.46 INJECTION, SOLUTION INTRAVENOUS at 18:15

## 2020-12-20 RX ADMIN — LUBIPROSTONE 24 MCG: 24 CAPSULE, GELATIN COATED ORAL at 18:22

## 2020-12-20 RX ADMIN — PANTOPRAZOLE SODIUM 40 MG: 40 INJECTION, POWDER, LYOPHILIZED, FOR SOLUTION INTRAVENOUS at 14:32

## 2020-12-20 RX ADMIN — SODIUM CHLORIDE: 9 INJECTION, SOLUTION INTRAVENOUS at 21:30

## 2020-12-20 RX ADMIN — QUETIAPINE FUMARATE 150 MG: 100 TABLET ORAL at 20:49

## 2020-12-20 RX ADMIN — POTASSIUM CHLORIDE 10 MEQ: 7.46 INJECTION, SOLUTION INTRAVENOUS at 23:23

## 2020-12-20 RX ADMIN — SODIUM CHLORIDE, PRESERVATIVE FREE 10 ML: 5 INJECTION INTRAVENOUS at 20:41

## 2020-12-20 RX ADMIN — PANTOPRAZOLE SODIUM 40 MG: 40 INJECTION, POWDER, LYOPHILIZED, FOR SOLUTION INTRAVENOUS at 20:48

## 2020-12-20 RX ADMIN — LUBIPROSTONE 24 MCG: 24 CAPSULE, GELATIN COATED ORAL at 12:13

## 2020-12-20 RX ADMIN — POTASSIUM CHLORIDE 10 MEQ: 7.46 INJECTION, SOLUTION INTRAVENOUS at 04:52

## 2020-12-20 RX ADMIN — HYDRALAZINE HYDROCHLORIDE 10 MG: 20 INJECTION INTRAMUSCULAR; INTRAVENOUS at 05:52

## 2020-12-20 RX ADMIN — ENOXAPARIN SODIUM 40 MG: 40 INJECTION SUBCUTANEOUS at 12:12

## 2020-12-20 RX ADMIN — POTASSIUM CHLORIDE 10 MEQ: 7.46 INJECTION, SOLUTION INTRAVENOUS at 08:43

## 2020-12-20 RX ADMIN — ENALAPRIL MALEATE 5 MG: 5 TABLET ORAL at 12:11

## 2020-12-20 RX ADMIN — METOCLOPRAMIDE 10 MG: 5 INJECTION, SOLUTION INTRAMUSCULAR; INTRAVENOUS at 01:29

## 2020-12-20 RX ADMIN — POTASSIUM CHLORIDE 10 MEQ: 7.46 INJECTION, SOLUTION INTRAVENOUS at 09:59

## 2020-12-20 RX ADMIN — POTASSIUM CHLORIDE 10 MEQ: 7.46 INJECTION, SOLUTION INTRAVENOUS at 19:23

## 2020-12-20 RX ADMIN — POTASSIUM CHLORIDE 10 MEQ: 7.46 INJECTION, SOLUTION INTRAVENOUS at 07:41

## 2020-12-20 RX ADMIN — CARVEDILOL 6.25 MG: 6.25 TABLET, FILM COATED ORAL at 18:22

## 2020-12-20 RX ADMIN — ATORVASTATIN CALCIUM 20 MG: 20 TABLET, FILM COATED ORAL at 20:49

## 2020-12-20 RX ADMIN — POTASSIUM CHLORIDE 10 MEQ: 7.46 INJECTION, SOLUTION INTRAVENOUS at 22:00

## 2020-12-20 RX ADMIN — MEMANTINE 10 MG: 10 TABLET ORAL at 12:11

## 2020-12-20 RX ADMIN — SENNOSIDES 17.2 MG: 8.6 TABLET, FILM COATED ORAL at 20:49

## 2020-12-20 RX ADMIN — DILTIAZEM HYDROCHLORIDE 120 MG: 120 CAPSULE, COATED, EXTENDED RELEASE ORAL at 12:11

## 2020-12-20 NOTE — PROGRESS NOTES
[facility]        Progress Note       2020  9:04 AM    Patient:    Felecia Gardiner  : 1940   [de-identified] y.o. MRN: [id number]  Admitted: 12/15/2020  7:57 PM ATT: Chrissy Jordan MD   1119/1119-A  AdmitDx: Feculent vomit on examination [R11.13]  Intractable vomiting with nausea [R11.2]  Non-intractable vomiting with nausea, unspecified vomiting type [R11.2]  PCP: Evaristo Huffman MD    SUBJECTIVE:  Chart reviewed, events noted  Patient more alert today. Ate 1/3 of banana this am and some apple juice. No complaints. Brown soft BM yesterday. Tolerating diet. No N/V or abdominal pain.     ROS:  The positive ROS will be identified in bold     CONSTITUTIONAL:  Neg  Weight loss, fatigue, fever  MOUTH/THROAT:  Neg  Bleeding gums, hoarseness or sore throat  RESPIRATORY:   Neg SOB, wheeze, cough, hemoptysis or bronchitis  CARDIOVASCULAR:  Neg Chest pain, palpitations, dyspnea on exertion, edema  GASTROINTESTINAL:  SEE HPI  HEMATOLOGIC/LYMPHATIC:  Neg  Anemia, bleeding tendency  MUSCULOSKELETAL: Neg  New myalgias, joint pain, swelling or stiffness  NEUROLOGICAL:  Neg  Loss of Consciousness, memory loss, forgetfulness, periods of confusion, difficulty concentrating, seizures, insomnia, aphasia    SKIN:  Neg No itching, rashes, or sores  PSYCHIATRIC:  Neg Depression, personality changes, anxiety    OBJECTIVE:      BP (!) 146/92 Comment: after hydralazine IVPB  Pulse 67   Temp 97.6 °F (36.4 °C) (Oral)   Resp 16   Wt 121 lb (54.9 kg)   SpO2 96%   BMI 23.63 kg/m²     NAD, appears comfortable, sitting up in bed  Lips and mucous membranes pink and moist  RRR, Nl s1s2, no murmurs, no JVD  Lungs CTA bilaterally, respirations even and unlabored   Abdomen soft, ND, NT, no HSM, bowel sounds normal  Skin pink, warm, dry and CR brisk   Trace edema bilateral lower extremities   AAOx1 self      CBC:   Recent Labs     20  0700 20  0412 20  0350   WBC  --   --  5.2   HGB 10.9* 11.1* 11.7* PLT  --   --  150     BMP:    Recent Labs     12/17/20  1343 12/20/20  0350    137   K 3.1* 2.9*    102   CO2 24 23   BUN 30* 10   CREATININE 0.9 0.8   GLUCOSE 98 89     Magnesium:   Lab Results   Component Value Date    MG 1.9 12/17/2020     Hepatic:   Recent Labs     12/17/20  1343   AST 14*   ALT 8*   BILITOT 0.4   ALKPHOS 69     INR: No results for input(s): INR in the last 72 hours.       Intake/Output Summary (Last 24 hours) at 12/20/2020 2142  Last data filed at 12/19/2020 1215  Gross per 24 hour   Intake 50 ml   Output --   Net 50 ml         Medications    Scheduled Medications:    pantoprazole  40 mg Intravenous BID    fluconazole  100 mg Intravenous Q24H    sodium chloride flush  10 mL Intravenous 2 times per day    enoxaparin  40 mg Subcutaneous Daily    aspirin EC  81 mg Oral Daily    atorvastatin  20 mg Oral Daily    carvedilol  6.25 mg Oral BID WC    dilTIAZem  120 mg Oral Daily    donepezil  10 mg Oral Nightly    enalapril  5 mg Oral Daily    QUEtiapine  150 mg Oral Nightly    memantine  10 mg Oral BID    cefTRIAXone (ROCEPHIN) IV  1 g Intravenous Q24H    metoclopramide  10 mg Intravenous Q8H    lubiprostone  24 mcg Oral BID WC    senna  2 tablet Oral Nightly    sodium chloride flush  10 mL Intravenous BID     PRN Medications: potassium chloride, sodium chloride flush, promethazine **OR** ondansetron, polyethylene glycol, acetaminophen **OR** acetaminophen  Infusions:    sodium chloride 75 mL/hr at 12/20/20 0741         Patient Active Problem List   Diagnosis Code    Chest pain R07.9    Renal insufficiency N28.9    GERD (gastroesophageal reflux disease) K21.9    Hypercholesterolemia E78.00    Anxiety F41.9    Hypertension I10    Osteoarthritis M19.90    CAD (coronary artery disease) I25.10    Intractable vomiting with nausea R11.2        ASSESSMENT AND RECOMMENDATIONS:    Nausea, vomiting:  Resolved  Previous CT abd pelvis revealed large hiatal hernia, apparent thickening at GEJ  EGD yesterday ( 12/19/2020) revealed severe erosive esophagitis lower half of esophagus, moderate HH, brushings done to r/o candida  Continue Reglan 10 mg TID today  Zofran 4 mg PRN   Continue Protonix 40 mg BID at discharge   Diflucan for 10 days total  Mechanical soft diet as tolerated     Constipation:  Resolved   CT revealed large fecal matter rectal vault   May be secondary decreased mobility, aggravated by dementia    Continue Senna 2 tabs at HS    Continue Amitiza 24  mcg BID   Continue daily probiotic       Melena:  Resolved   May be s/t severe erosive esophagitis   Hgb stable at 11.7  Continue PPI as above     Will sign off, FU OP     Discussed plan of care with patient, Jcarlos CRUZ, pt's daughter Maribeth Montague     Patient clinical, biochemical, and radiological information discussed with Dr. Angelika Bui. He agrees with the assessment and plan. Ary Marques, CNP  12/20/2020  9:04 AM     Much better  Continue Diflucan for 10 days today  PPI BID    I have seen and examined this patient personally, and independently of the nurse practitioner. The plan was developed mutually at the time of the visit with the patient. Dayna Velásquez and myself have spoken with patient, nursing staff and provided written and verbal instructions .     The above note has been reviewed and I agree with the Assessment,  Diagnosis, and Treatment plan as suggested by [facility]

## 2020-12-20 NOTE — PROGRESS NOTES
Callum Altamirano MD, [address]                Internal Medicine Hospitalist             Daily Progress  Note   Subjective:     Chief Complaint   Patient presents with    Constipation     constipation x 3 days    Emesis     Ms. Mock Complains of nil, confused ? Base line. No vomiting. Objective:    BP (!) 156/89   Pulse 67   Temp 96.2 °F (35.7 °C) (Oral)   Resp 15   Wt 121 lb (54.9 kg)   SpO2 95%   BMI 23.63 kg/m²    No intake or output data in the 24 hours ending 12/20/20 1250   Physical Exam:  Heart:  Distant heart sounds. Lungs: difficult to examine as she is all curled up no obvious wheezes noted. Abdomen: Soft, non distended. Bowel sounds appreciated. No obvious liver or spleen enlargement. Non tender, no rebound noted. Extremities: Non tender, no swelling noted, strength 5/5 both legs. CNS: Grossly intact. Labs:  CBC with Differential:    Lab Results   Component Value Date    WBC 5.2 12/20/2020    RBC 3.56 12/20/2020    HGB 11.7 12/20/2020    HCT 32.2 12/20/2020     12/20/2020    MCV 90.4 12/20/2020    MCH 32.9 12/20/2020    MCHC 36.3 12/20/2020    RDW 11.6 12/20/2020    SEGSPCT 63.0 12/17/2020    LYMPHOPCT 25.6 12/17/2020    MONOPCT 8.2 12/17/2020    BASOPCT 0.4 12/17/2020    MONOSABS 0.6 12/17/2020    LYMPHSABS 2.0 12/17/2020    EOSABS 0.2 12/17/2020    BASOSABS 0.0 12/17/2020    DIFFTYPE AUTOMATED DIFFERENTIAL 12/17/2020     BMP:    Lab Results   Component Value Date     12/20/2020    K 2.9 12/20/2020     12/20/2020    CO2 23 12/20/2020    BUN 10 12/20/2020    LABALBU 3.0 12/17/2020    CREATININE 0.8 12/20/2020    CALCIUM 7.9 12/20/2020    GFRAA >60 12/20/2020    LABGLOM >60 12/20/2020    GLUCOSE 89 12/20/2020     No results for input(s): TROPONINT in the last 72 hours.   No results found for: TSH      sodium chloride 75 mL/hr at 12/20/20 0741      pantoprazole  40 mg Intravenous BID    fluconazole  100 mg Intravenous Q24H    sodium chloride flush  10 mL Intravenous 2 times per day    enoxaparin  40 mg Subcutaneous Daily    aspirin EC  81 mg Oral Daily    atorvastatin  20 mg Oral Daily    carvedilol  6.25 mg Oral BID WC    dilTIAZem  120 mg Oral Daily    donepezil  10 mg Oral Nightly    enalapril  5 mg Oral Daily    QUEtiapine  150 mg Oral Nightly    memantine  10 mg Oral BID    cefTRIAXone (ROCEPHIN) IV  1 g Intravenous Q24H    metoclopramide  10 mg Intravenous Q8H    lubiprostone  24 mcg Oral BID WC    senna  2 tablet Oral Nightly    sodium chloride flush  10 mL Intravenous BID         Assessment:       Patient Active Problem List    Diagnosis Date Noted    Intractable vomiting with nausea 12/16/2020    CAD (coronary artery disease)     Chest pain 12/31/2013    Renal insufficiency 12/31/2013    GERD (gastroesophageal reflux disease) 12/31/2013    Hypercholesterolemia 12/31/2013    Anxiety 12/31/2013    Hypertension 12/31/2013    Osteoarthritis 12/31/2013       Plan:     Problems being addressed this admission:   Intractable vomiting / constipation  12/16/20-Bilious vomiting: No evidence of bowel obstruction. CT of the abdomen with large hiatal hernia, large amount of fecal material at the rectal vault. Advance diet to general, IV fluid. Refused surgical procedure or endoscopic procedure. Continue antiemetics. Constipation: Manual disimxpaction done at the ER.  Start laxatives. 12/17/20- seen by GI and is to have EGD  This Saturday. Started on Reglan and Amitiza for her constipation. On clears.   12/18/20- hb is 10.9 today.  To have EGD in am per GI. Continue to monitor no more vomiting noted. She had a BM last night per RN.   12/19/20- egd done today has severe erosive esophagitis r/o candida PPI bid, diflucan x 10 days as per GI. Possible discharge in am may need PT/OT/CM May decide to go home with [facility].   12/20/20- seems stable discharge when K is replaced and resolved.  Possible home tomorrow.      Hypokalemia  12/17/20- K today is 3.1 continue to monitor. Replace.   12/18/20- no labs from today.   12/19/20- repeat labs in am.   12/20/20- K is 2.9 today being replaced.       Chronic medical problems as of 12/16/20  Abdominal aortic aneurysm  Alzheimer's dementia: Continue medications. Hypertension: Blood pressure controlled. Continue medications.   Hyperlipidemia: continue statin  CAD: continue medications.      Consultants:  GI    General Orders:  Repeat basic labs again in am.  I have d/w RN as well. I have been told that she goes home to her daughter.    Marisa Ellsworth MD, Adrian Singh

## 2020-12-21 VITALS
TEMPERATURE: 98.4 F | OXYGEN SATURATION: 94 % | BODY MASS INDEX: 24.74 KG/M2 | SYSTOLIC BLOOD PRESSURE: 133 MMHG | DIASTOLIC BLOOD PRESSURE: 77 MMHG | WEIGHT: 126.7 LBS | RESPIRATION RATE: 14 BRPM | HEART RATE: 61 BPM

## 2020-12-21 LAB
ANION GAP SERPL CALCULATED.3IONS-SCNC: 13 MMOL/L (ref 4–16)
BUN BLDV-MCNC: 7 MG/DL (ref 6–23)
CALCIUM SERPL-MCNC: 8.2 MG/DL (ref 8.3–10.6)
CHLORIDE BLD-SCNC: 108 MMOL/L (ref 99–110)
CO2: 19 MMOL/L (ref 21–32)
CREAT SERPL-MCNC: 0.8 MG/DL (ref 0.6–1.1)
GFR AFRICAN AMERICAN: >60 ML/MIN/1.73M2
GFR NON-AFRICAN AMERICAN: >60 ML/MIN/1.73M2
GLUCOSE BLD-MCNC: 96 MG/DL (ref 70–99)
HCT VFR BLD CALC: 31.1 % (ref 37–47)
HEMOGLOBIN: 11.4 GM/DL (ref 12.5–16)
POTASSIUM SERPL-SCNC: 3.6 MMOL/L (ref 3.5–5.1)
SODIUM BLD-SCNC: 140 MMOL/L (ref 135–145)

## 2020-12-21 PROCEDURE — 97535 SELF CARE MNGMENT TRAINING: CPT

## 2020-12-21 PROCEDURE — G0378 HOSPITAL OBSERVATION PER HR: HCPCS

## 2020-12-21 PROCEDURE — C9113 INJ PANTOPRAZOLE SODIUM, VIA: HCPCS | Performed by: INTERNAL MEDICINE

## 2020-12-21 PROCEDURE — 97166 OT EVAL MOD COMPLEX 45 MIN: CPT

## 2020-12-21 PROCEDURE — 80048 BASIC METABOLIC PNL TOTAL CA: CPT

## 2020-12-21 PROCEDURE — 96367 TX/PROPH/DG ADDL SEQ IV INF: CPT

## 2020-12-21 PROCEDURE — 97530 THERAPEUTIC ACTIVITIES: CPT

## 2020-12-21 PROCEDURE — 85018 HEMOGLOBIN: CPT

## 2020-12-21 PROCEDURE — 96372 THER/PROPH/DIAG INJ SC/IM: CPT

## 2020-12-21 PROCEDURE — 6360000002 HC RX W HCPCS: Performed by: INTERNAL MEDICINE

## 2020-12-21 PROCEDURE — 36415 COLL VENOUS BLD VENIPUNCTURE: CPT

## 2020-12-21 PROCEDURE — 6370000000 HC RX 637 (ALT 250 FOR IP): Performed by: INTERNAL MEDICINE

## 2020-12-21 PROCEDURE — 96376 TX/PRO/DX INJ SAME DRUG ADON: CPT

## 2020-12-21 PROCEDURE — 85014 HEMATOCRIT: CPT

## 2020-12-21 PROCEDURE — 97162 PT EVAL MOD COMPLEX 30 MIN: CPT

## 2020-12-21 PROCEDURE — 2580000003 HC RX 258: Performed by: INTERNAL MEDICINE

## 2020-12-21 PROCEDURE — 96366 THER/PROPH/DIAG IV INF ADDON: CPT

## 2020-12-21 RX ORDER — PANTOPRAZOLE SODIUM 40 MG/1
40 TABLET, DELAYED RELEASE ORAL
Qty: 60 TABLET | Refills: 0 | Status: SHIPPED | OUTPATIENT
Start: 2020-12-21

## 2020-12-21 RX ORDER — ONDANSETRON 4 MG/1
4 TABLET, ORALLY DISINTEGRATING ORAL 3 TIMES DAILY PRN
Qty: 21 TABLET | Refills: 0 | Status: SHIPPED | OUTPATIENT
Start: 2020-12-21

## 2020-12-21 RX ORDER — POTASSIUM CHLORIDE 20 MEQ/1
20 TABLET, EXTENDED RELEASE ORAL 2 TIMES DAILY
Qty: 8 TABLET | Refills: 0 | Status: SHIPPED | OUTPATIENT
Start: 2020-12-21 | End: 2020-12-25

## 2020-12-21 RX ORDER — FLUCONAZOLE 100 MG/1
200 TABLET ORAL DAILY
Qty: 14 TABLET | Refills: 0 | Status: SHIPPED | OUTPATIENT
Start: 2020-12-21 | End: 2020-12-28

## 2020-12-21 RX ORDER — LUBIPROSTONE 24 UG/1
24 CAPSULE, GELATIN COATED ORAL 2 TIMES DAILY WITH MEALS
Qty: 60 CAPSULE | Refills: 3 | Status: SHIPPED | OUTPATIENT
Start: 2020-12-21

## 2020-12-21 RX ORDER — SENNA PLUS 8.6 MG/1
2 TABLET ORAL NIGHTLY
Qty: 60 TABLET | Refills: 0 | Status: SHIPPED | OUTPATIENT
Start: 2020-12-21 | End: 2021-01-20

## 2020-12-21 RX ADMIN — ENALAPRIL MALEATE 5 MG: 5 TABLET ORAL at 11:51

## 2020-12-21 RX ADMIN — LUBIPROSTONE 24 MCG: 24 CAPSULE, GELATIN COATED ORAL at 18:41

## 2020-12-21 RX ADMIN — CEFTRIAXONE 1 G: 1 INJECTION, POWDER, FOR SOLUTION INTRAMUSCULAR; INTRAVENOUS at 12:06

## 2020-12-21 RX ADMIN — ENOXAPARIN SODIUM 40 MG: 40 INJECTION SUBCUTANEOUS at 08:24

## 2020-12-21 RX ADMIN — MEMANTINE 10 MG: 10 TABLET ORAL at 08:24

## 2020-12-21 RX ADMIN — ASPIRIN 81 MG: 81 TABLET, COATED ORAL at 08:24

## 2020-12-21 RX ADMIN — CARVEDILOL 6.25 MG: 6.25 TABLET, FILM COATED ORAL at 18:20

## 2020-12-21 RX ADMIN — FLUCONAZOLE 100 MG: 2 INJECTION, SOLUTION INTRAVENOUS at 18:20

## 2020-12-21 RX ADMIN — CARVEDILOL 6.25 MG: 6.25 TABLET, FILM COATED ORAL at 08:24

## 2020-12-21 RX ADMIN — LUBIPROSTONE 24 MCG: 24 CAPSULE, GELATIN COATED ORAL at 08:24

## 2020-12-21 RX ADMIN — DILTIAZEM HYDROCHLORIDE 120 MG: 120 CAPSULE, COATED, EXTENDED RELEASE ORAL at 08:24

## 2020-12-21 RX ADMIN — PANTOPRAZOLE SODIUM 40 MG: 40 INJECTION, POWDER, LYOPHILIZED, FOR SOLUTION INTRAVENOUS at 08:20

## 2020-12-21 NOTE — CONSULTS
[facility] PHYSICAL THERAPY EVALUATION  Brandi Ballard, 1940, 1123/1123-A, 12/21/2020    History  [place]:  The primary encounter diagnosis was Non-intractable vomiting with nausea, unspecified vomiting type. A diagnosis of Feculent vomit on examination was also pertinent to this visit. Patient  has a past medical history of Arthritis, Asthma, CAD (coronary artery disease), Chest pain, Dementia ([address] GERD (gastroesophageal reflux disease), H/O cardiovascular stress test, H/O cardiovascular stress test, History of exercise stress test, History of nuclear stress test, Hx of cardiovascular stress test, Hx of echocardiogram, Hypertension, Movement disorder, Psychiatric problem, and S/P PTCA (percutaneous transluminal coronary angioplasty). Patient  has a past surgical history that includes Cholecystectomy; eye surgery; Coronary angioplasty with stent (01/02/2014); Carpal tunnel release (Bilateral); Colonoscopy (12/14/2015); and Upper gastrointestinal endoscopy (N/A, 12/19/2020). Subjective:  Patient states:  Limited verbalization. Pain:  No pain c/o or behaviors. Communication with other providers:  Handoff to RN, co-eval with OT.    Restrictions: Fall risk    Home Setup/Prior level of function  Social/Functional History  Lives With: Daughter  Type of Home: House  Home Layout: One level  Home Access: Stairs to enter without rails  Entrance Stairs - Number of Steps: 2  Bathroom Toilet: Bedside commode  Home Equipment: Rolling walker, Hospital bed, [address] Help From: Family, Home health  ADL Assistance: Needs assistance  Homemaking Responsibilities: No  Ambulation Assistance: Needs assistance  Transfer Assistance: Needs assistance  Active : No  Additional Comments: Pt poor histprian, all home information obtained from chart    Examination of body systems (includes body structures/functions, activity/participation limitations):  · Observation:  Supine in bed assist if feasible for family and caregivers. Could use WC and BSC while addressing gait training with [facility]. Could benefit from skilled rehab however likely with limited carryover d/t significant dementia. Complexity: Moderate  Prognosis: Good, no significant barriers to participation at this time. Plan Times per week: 2+/week, 1 week,      Equipment: has all necessary equipment. Goals:  Short term goals  Time Frame for Short term goals: 1 week  Short term goal 1: Patient will perform sup to sit CGA. Short term goal 2: Patient will perform STS CGA with LRAD. Short term goal 3: Patient will ambulate x10ft with LRAD CGA. Treatment plan:  Bed mobility, transfers, balance, gait, TA, TX. Recommendations for NURSING mobility: stand pivot with gait belt.     Time:   Time in: 0955  Time out: 1018  Timed treatment minutes: 15  Total time: 23    Electronically signed by:    Bella Reilly, SONIA  12/21/2020, 10:37 AM

## 2020-12-21 NOTE — DISCHARGE SUMMARY
Discharge Summary    Name:  Dina Mcclure /Age/Sex: 1940  ([de-identified] y.o. female)   MRN & CSN:  [id number] & [id number] Admission Date/Time: 12/15/2020  7:57 PM   Attending:  Nubia Borja MD Discharging Physician: Nubia Borja MD       Hospital Course: Dina Mcclure is a [de-identified] y.o.  female  who presents with Intractable vomiting with nausea    1. Intractable nausea and vomiting , constipation no evidence of bowel obstruction status post EGD 2020, which showed severe erosive esophagitis    · CT of the abdomen with large hiatal hernia, large amount of fecal material at the rectal vault. · Advance diet to general, IV fluid. · Continue antiemetics. · Seen by GI, add on PPI twice daily, Diflucan to be completed for 10 days follow-up as outpatient     2. Constipation: Manual disimxpaction done at the ER. Start laxatives, GI on board, started on Amitiza  3. Hypokalemia, replaced, labs in 1 week as outpatient  4. Abdominal aortic aneurysm, Stable  5. Alzheimer's dementia: Continue medications. 6. Hypertension: Blood pressure controlled. Continue medications. 7. Hyperlipidemia: continue statin  8. CAD: continue medications    The patient expressed appropriate understanding of and agreement with the discharge recommendations, medications, and plan. Consults this admission:  IP CONSULT TO HOSPITALIST  IP CONSULT TO GI  IP CONSULT TO [address]    Discharge Instruction:   Follow up appointments: GI  Primary care physician:  within 2 weeks    Diet:  regular diet   Activity: activity as tolerated  Disposition: Discharged to:   []Home, [x]C, []SNF, []Acute Rehab, []Hospice   Condition on discharge: Stable    Discharge Medications:      Aleshia Mock 83. Medication Instructions ETE:[id number]    Printed on:20 9408   Medication Information                      aspirin EC 81 MG EC tablet  Take 1 tablet by mouth daily.              atorvastatin (LIPITOR) 20 MG tablet  Take 20 mg by mouth daily.             carvedilol (COREG) 6.25 MG tablet  Take 6.25 mg by mouth 2 times daily (with meals). diltiazem (CARDIZEM CD) 120 MG ER capsule  Take 120 mg by mouth daily              donepezil (ARICEPT) 10 MG tablet  Take 10 mg by mouth nightly. enalapril (VASOTEC) 5 MG tablet  Take 5 mg by mouth daily             fluconazole (DIFLUCAN) 100 MG tablet  Take 2 tablets by mouth daily for 7 days             LORazepam (ATIVAN) 1 MG tablet  Take 1 mg by mouth every 8 hours as needed for Anxiety. lubiprostone (AMITIZA) 24 MCG capsule  Take 1 capsule by mouth 2 times daily (with meals)             memantine (NAMENDA XR) 28 MG CP24 capsule  Take 28 mg by mouth daily             nitroGLYCERIN (NITROSTAT) 0.4 MG SL tablet  Place 1 tablet under the tongue every 5 minutes as needed for Chest pain             ondansetron (ZOFRAN-ODT) 4 MG disintegrating tablet  Take 1 tablet by mouth 3 times daily as needed for Nausea or Vomiting             pantoprazole (PROTONIX) 40 MG tablet  Take 1 tablet by mouth 2 times daily (before meals)             potassium chloride (KLOR-CON M) 20 MEQ extended release tablet  Take 1 tablet by mouth 2 times daily for 4 days             QUEtiapine (SEROQUEL) 300 MG tablet  Take 150 mg by mouth nightly             senna (SENOKOT) 8.6 MG tablet  Take 2 tablets by mouth nightly                 Objective Findings at Discharge:   BP (!) 159/78   Pulse 65   Temp 98.4 °F (36.9 °C) (Oral)   Resp 14   Wt 126 lb 11.2 oz (57.5 kg)   SpO2 94%   BMI 24.74 kg/m²            PHYSICAL EXAM   GEN Awake female, sitting upright in bed in no apparent distress. Appears given age. EYES Pupils are equally round. No scleral erythema, discharge, or conjunctivitis. HENT Mucous membranes are moist.   NECK No apparent thyromegaly or masses. RESP Clear to auscultation, no wheezes, rales or rhonchi. Symmetric chest movement while on room air. CARDIO/VASC S1/S2 auscultated.  Regular rate without appreciable murmurs, rubs, or gallops. Peripheral pulses equal bilaterally and palpable. No peripheral edema. GI Abdomen is soft without significant tenderness, masses, or guarding. Bowel sounds are normoactive. Rectal exam deferred.  Flynn catheter is not present. HEME/LYMPH No petechiae or ecchymoses. MSK No gross joint deformities. Spontaneous movement of all extremities  SKIN Normal coloration, warm, dry. NEURO Cranial nerves appear grossly intact, normal speech, no lateralizing weakness. PSYCH Awake, alert, oriented x 4. Affect appropriate. BMP/CBC  Recent Labs     12/19/20  0412 12/20/20  0350 12/20/20  1502 12/21/20  0647   NA  --  137  --  140   K  --  2.9* 3.3* 3.6   CL  --  102  --  108   CO2  --  23  --  19*   BUN  --  10  --  7   CREATININE  --  0.8  --  0.8   WBC  --  5.2  --   --    HCT 31.1* 32.2*  --  31.1*   PLT  --  150  --   --        IMAGING:  Ct Abdomen Pelvis W Iv Contrast    Result Date: 12/15/2020  EXAMINATION: CT OF THE ABDOMEN AND PELVIS WITH CONTRAST 12/15/2020 11:39 pm TECHNIQUE: CT of the abdomen and pelvis was performed with the administration of intravenous contrast. Multiplanar reformatted images are provided for review. Dose modulation, iterative reconstruction, and/or weight based adjustment of the mA/kV was utilized to reduce the radiation dose to as low as reasonably achievable. COMPARISON: 07/25/2020 HISTORY: ORDERING SYSTEM PROVIDED HISTORY: abdominal pain TECHNOLOGIST PROVIDED HISTORY: IV contrast only. Thank you. Reason for exam:->abdominal pain Reason for exam:->IV contrast only. Thank you. FINDINGS: Lower Chest: Small bilateral pleural effusions. Large hiatal hernia with apparent thickening of the gastroesophageal junction. Organs: Cholecystectomy. Simple left lobe liver cyst again noted. No acute abnormality of the enhanced liver, spleen, adrenal glands, pancreas. The kidneys appear normal in size and demonstrate symmetric enhancement.   No focal renal mass. No hydronephrosis. No perinephric stranding. GI/Bowel: No evidence of bowel obstruction or bowel wall thickening. Normal appendix visualized. Large amount of fecal material in the rectal vault. Pelvis: The urinary bladder appears unremarkable. The pelvic organs demonstrate no acute abnormality. Peritoneum/Retroperitoneum: Extensive atherosclerotic disease of the aorta with eccentric mural thrombus. Mild aneurysmal dilatation, 2.7 cm maximally, infrarenal.  No fluid collection or free air. No gross lymphadenopathy. Bones/Soft Tissues: No acute findings. Large hiatal hernia with apparent thickening at the gastroesophageal junction. Upper endoscopy can be considered. No acute findings in the abdomen or pelvis. Abdominal aortic aneurysm, 2.7 cm. RECOMMENDATIONS: For management of fusiform AAA: 2.6-2.9 cm aorta, recommend follow-up every 5 years or aortas meeting the criteria for AAA (>1.5 x proximal normal segment; no f/u if < 1.5 x proximal normal segment; no f/u for aortas < 2.6 cm). * For management of saccular abdominal aortic aneurysms of any size, recommend vascular consultation. Note:  For AAA enlargement of >0.5 cm in 6 months or >1 cm in 1 year, recommend vascular consultation. References: Gaston Cos Radiol 2013; 40(67):324-407; J Vasc Surg.  2018; 67:2-77     Egd    Result Date: 12/19/2020  No dictation     Discharge Time of 33 minutes    Electronically signed by Izaiah Roa MD on 12/21/2020 at 5:14 PM

## 2020-12-21 NOTE — CARE COORDINATION
LSW noted that pt has a discharge for today. LSW call to [facility] to notify of the discharge. LSW faxed discharge packet to [facility].

## 2020-12-21 NOTE — PROGRESS NOTES
[facility] CARE OCCUPATIONAL THERAPY EVALUATION    Carole Vcaa, 1940, 1123/1123-A, 12/21/2020    Discharge Recommendation: Home with 24 hour supervision/assistance, Home Health OT services. History:  [place]:  The primary encounter diagnosis was Non-intractable vomiting with nausea, unspecified vomiting type. A diagnosis of Feculent vomit on examination was also pertinent to this visit.     Subjective:  Patient states: Agreeable to therapy  Pain: Pt did not report pain this date  Communication with other providers: PT Ruth Handing  Restrictions: General Precautions, Fall Risk    Home Setup/Prior level of function:  Social/Functional History  Lives With: Daughter  Type of Home: House  Home Layout: One level  Home Access: Stairs to enter without rails  Entrance Stairs - Number of Steps: 2  Bathroom Toilet: Bedside commode  Home Equipment: Rolling walker, Hospital bed, [address] Help From: Family, Home health  ADL Assistance: Needs assistance  Homemaking Responsibilities: No  Ambulation Assistance: Needs assistance  Transfer Assistance: Needs assistance  Active : No  Additional Comments: Pt alexus histprian, all home information obtained from chart    Examination:  · Observation: Side-lying in bed upon arrival  · Vision: [facility] [place]  · Hearing: [facility]  · Vitals: Stable vitals throughout session, no signs or symptoms of distress    Body Systems and functions:  · ROM: WFL   · Strength: Unable to formally assess d/t pts cognitive status, appears [place] for tasks performed this session  · Sensation: WFL  · Tone: Normal  · Coordination: WFL  · Perception: WNL    Activities of Daily Living (ADLs):  · Feeding: Setup  · Grooming: min A (anticipate pt would require min A for initiation and thoroughness in task)  · UB bathing: SBA (recommend pt complete in seated)  · LB bathing: min A (recommend pt complete in seated, anticipate pt would require assist for distal reaching and initiation)  · UB dressing: min A (recommend pt complete in seated)  · LB dressing: mod A (pt able to jesus brief given mod A for threading feet and max cues for initiation)  · Toileting: mod A (pt required assist for clothing management, toilet transfer, and daisha care at standard commode)    Cognitive and Psychosocial Functioning:  · Overall cognitive status: pt with baseline dx of dementia; pt is alert, only oriented to self and requires max verbal/visual/tactile cues for initiation, sequencing, and thoroughness in tasks  · Affect: Normal     Balance:   · Sitting: pt sat EOB given SBA, mod cues for upright posture with poor carryover  · Standing: at [facility] given CGA for safety and mod cues for upright posture and safe UE/LE placement with fair carryover    Functional Mobility:  · Bed Mobility: Pt required min A this date to position trunk/BLE d/t difficulty initiating  · Transfers: STS EOB>FWW, FWW<>standard commode, and FWW>armchair pt required min A with max cues for initiation, sequencing, safe positioning, and FWW management    · Ambulation: Pt ambulated to restroom with FWW given CGA for safety and cues for initiation, safety awareness, positioning, and FWW management      AM-PAC 6 click short form for inpatient daily activity:   How much help from another person does the patient currently need. .. Unable  Dep A Lot  Max A A Lot   Mod A A Little  Min A A Little   CGA  SBA None   Mod I  Indep  Sup   1. Putting on and taking off regular lower body clothing? [] 1    [] 2   [x] 2   [] 3   [] 3   [] 4      2. Bathing (including washing, rinsing, drying)? [] 1   [] 2   [] 2 [x] 3 [] 3 [] 4   3. Toileting, which includes using toilet, bedpan, or urinal? [] 1    [] 2   [x] 2   [] 3   [] 3   [] 4     4. Putting on and taking off regular upper body clothing? [] 1   [] 2   [] 2   [x] 3   [] 3    [] 4      5. Taking care of personal grooming such as brushing teeth? [] 1   [] 2    [] 2 [x] 3    [] 3   [] 4      6.  Eating meals?   [] 1   [] 2   [] 2   [] 3   [x] 3   [] 4      Raw Score:  16     [24=0% impaired(CH), 23=1-19%(CI), 20-22=20-39%(CJ), 15-19=40-59%(CK), 10-14=60-79%(CL), 7-9=80-99%(CM), 6=100%(CN)]     Treatment:  Self Care Training:   Cues were given for safety, sequence, UE/LE placement, visual cues, and balance. Activities performed today included toileting and hand hygiene. Pt req mod A overall in toileting this date; mod A to transfer from [address] to sit at standard commode, min A to thread feet through brief, and max A for daisha care in standing d/t pt decreased dynamic standing balance; pt required max verbal/visual/tactile cues throughout for initiation, UE/LE positioning, safety awareness, and thoroughness in tasks. Safety Measures: Gait belt used, Left in armchair with all needs met, Alarm in place    Assessment:  Pt is a [de-identified] y.o. F who  has a past medical history of Arthritis, Asthma, CAD (coronary artery disease), Chest pain, Dementia ([address] GERD (gastroesophageal reflux disease), H/O cardiovascular stress test, H/O cardiovascular stress test, History of exercise stress test, History of nuclear stress test, Hx of cardiovascular stress test, Hx of echocardiogram, Hypertension, Movement disorder, Psychiatric problem, and S/P PTCA (percutaneous transluminal coronary angioplasty). Presented to the ED 12/15 for N/V and constipation x3 days, dx with hiatal hernia and erosive esophagitis and admitted for further eval and treatment. Unable to determine PLOF d/t pts cognitive status, however its anticipated she is performing below her baseline in ADLs and functional transfers/mobility, currently requires min-mod physical assist and max verbal/visual/tactile cues for self-care tasks. Pt would benefit from continued skilled OT services at the next LOC. Per chart review, pt has adequate physical/cognitive supports at home, rec return home with 24hr sup/assist and HHOT to address functional deficits.     Complexity: Moderate  Prognosis: Fair  Plan: 2x/week    Goals:  1. Pt will complete all aspects of bed mobility for EOB/OOB ADLs given SBA. 2. Pt will complete UB/LB bathing given SBA, AE prn.  3. Pt will complete all aspects of LB dressing given min A, AE prn.  4. Pt will complete all functional transfers to and from bed, chair, toilet, shower chair given CGA. 5. Pt will ambulate HH distance to bathroom for toileting given SBA, RW prn  6. Pt will complete all aspects of toileting task given min A  7. Pt will complete oral hygiene/grooming routine in standing at sink given min A  8. Pt will complete ther ex/ther act with focus on UB strengthening.       Time:   Time in: 0955  Time out: 1019  Timed treatment minutes: 14'  Total time: 24'      Electronically signed by:    AKHIL Goins/L, [facility] ALEN [id number]

## 2020-12-22 ENCOUNTER — CARE COORDINATION (OUTPATIENT)
Dept: CASE MANAGEMENT | Age: 80
End: 2020-12-22

## 2020-12-22 NOTE — CARE COORDINATION
Daniel 45 Transitions Initial Follow Up Call    Call within 2 business days of discharge: Yes    Patient: Allison Costello Patient : 1940   MRN: [id number]  Reason for Admission: N/V, Constipation, Hiatal Hernia, Severe Erosive Esophagitis   Discharge Date: 20 RARS: No data recorded  Facility: [address] [place] [address]       Complaint Diagnosis Description Type Department Provider    20 Altered Mental Status; Fall Fall, initial encounter . .. ED (DISCHARGE) [facility] ED Cody Hawkins MD      Non-face-to-face services provided:  Communication with home health agencies or other community services the patient is currently using-[place] [address] [facility] Team  .  1st attempt to reach for initial [address] discharge call unsuccessful x 3. Messages left requesting call back. T/C [facility] Team) to verify agency aware of discharge, on caseload.    Joey Randolph RN

## 2020-12-22 NOTE — CARE COORDINATION
Daniel 45 Transitions Initial Follow Up Call    Call within 2 business days of discharge: Yes    Patient: Ashok Larsen Patient : 1940   MRN: [id number]  Reason for Admission: N/V, Constipation, Hiatal Hernia, Severe Erosive Esophagitis   Discharge Date: 20 RARS: No data recorded  Facility: [address] [place] [address]       Complaint Diagnosis Description Type Department Provider    20 Altered Mental Status; Fall Fall, initial encounter . .. ED (DISCHARGE) [facility] ED Semaj Avendano MD      Non-face-to-face services provided:  Communication with home health agencies or other community services the patient is currently using-[address] Team  .  1st attempt to reach for initial [address] discharge call unsuccessful x 3. Messages left requesting call back. T/C [facility] Team) to verify agency aware of discharge, on caseload.    Radha Pepper RN

## 2020-12-22 NOTE — PROGRESS NOTES
Pt's Dtr updated as to Pt being discharged. Discharge instructions provided to Ramya Workman. . Pt discharged to Home with [facility] via Ramya Workman.  Electronically signed by Radha Elias LPN on 74/31/7509 at 7:54 PM

## 2020-12-23 ENCOUNTER — CARE COORDINATION (OUTPATIENT)
Dept: CASE MANAGEMENT | Age: 80
End: 2020-12-23

## 2020-12-23 NOTE — CARE COORDINATION
[facility]    Call within 2 business days of discharge: No    Patient: Rita Jeffrey Patient : 1940   MRN: [id number]  Reason for Admission:   Intractable N/V:  Severe erosive esophagitis  Discharge Date: 20 RARS: No data recorded    Last Discharge [facility]       Complaint Diagnosis Description Type Department Provider    12/15/20 Constipation; Emesis Non-intractable vomiting with nausea, unspecified vomiting type . .. ED to Hosp-Admission (Discharged) (ADMITTED) [facility] Lavina Lesch, MD; [place]. .. Follow Up:  COVID-19 Risk Monitoring:    Attempted to reach patient for Care Transition follow up. No answer to phone. Message left with [facility] contact information and request for call back. Second and final attempt. No further outreach planned. No future appointments.     Mary Ellen Whitaker RN

## (undated) DEVICE — Z DISCONTINUED (USE MFG CAT MVABO)  TUBING GAS SAMPLING STD 6.5 FT FEMALE CONN SMRT CAPNOLINE